# Patient Record
Sex: FEMALE | Race: WHITE | NOT HISPANIC OR LATINO | Employment: PART TIME | URBAN - METROPOLITAN AREA
[De-identification: names, ages, dates, MRNs, and addresses within clinical notes are randomized per-mention and may not be internally consistent; named-entity substitution may affect disease eponyms.]

---

## 2018-02-05 PROBLEM — I47.10 SVT (SUPRAVENTRICULAR TACHYCARDIA): Status: ACTIVE | Noted: 2018-02-05

## 2018-04-05 PROBLEM — I47.19 AVNRT (AV NODAL RE-ENTRY TACHYCARDIA): Status: ACTIVE | Noted: 2018-04-05

## 2020-11-23 PROBLEM — I47.10 SVT (SUPRAVENTRICULAR TACHYCARDIA): Status: RESOLVED | Noted: 2018-02-05 | Resolved: 2020-11-23

## 2020-11-23 PROBLEM — I47.19 AVNRT (AV NODAL RE-ENTRY TACHYCARDIA): Status: RESOLVED | Noted: 2018-04-05 | Resolved: 2020-11-23

## 2022-07-06 PROBLEM — E66.811 CLASS 1 OBESITY DUE TO EXCESS CALORIES WITHOUT SERIOUS COMORBIDITY WITH BODY MASS INDEX (BMI) OF 34.0 TO 34.9 IN ADULT: Status: ACTIVE | Noted: 2022-07-06

## 2022-12-16 DIAGNOSIS — B34.9 NONSPECIFIC SYNDROME SUGGESTIVE OF VIRAL ILLNESS: Primary | ICD-10-CM

## 2022-12-16 RX ORDER — BENZONATATE 100 MG/1
100 CAPSULE ORAL 3 TIMES DAILY PRN
Qty: 20 CAPSULE | Refills: 0 | Status: SHIPPED | OUTPATIENT
Start: 2022-12-16 | End: 2023-01-23

## 2023-05-03 ENCOUNTER — APPOINTMENT (OUTPATIENT)
Dept: LAB | Facility: CLINIC | Age: 78
End: 2023-05-03

## 2023-05-03 DIAGNOSIS — Z00.8 HEALTH EXAMINATION IN POPULATION SURVEY: ICD-10-CM

## 2023-05-03 LAB
CHOLEST SERPL-MCNC: 157 MG/DL
HDLC SERPL-MCNC: 69 MG/DL
LDLC SERPL CALC-MCNC: 74 MG/DL (ref 0–100)
NONHDLC SERPL-MCNC: 88 MG/DL
TRIGL SERPL-MCNC: 71 MG/DL

## 2023-05-05 LAB
EST. AVERAGE GLUCOSE BLD GHB EST-MCNC: 120 MG/DL
HBA1C MFR BLD: 5.8 %

## 2023-05-08 ENCOUNTER — DOCUMENTATION (OUTPATIENT)
Dept: INTERNAL MEDICINE CLINIC | Facility: CLINIC | Age: 78
End: 2023-05-08

## 2023-07-12 ENCOUNTER — OFFICE VISIT (OUTPATIENT)
Dept: GASTROENTEROLOGY | Facility: AMBULARY SURGERY CENTER | Age: 78
End: 2023-07-12
Payer: COMMERCIAL

## 2023-07-12 VITALS
HEIGHT: 64 IN | OXYGEN SATURATION: 98 % | BODY MASS INDEX: 34.59 KG/M2 | SYSTOLIC BLOOD PRESSURE: 126 MMHG | DIASTOLIC BLOOD PRESSURE: 82 MMHG | HEART RATE: 75 BPM | WEIGHT: 202.6 LBS

## 2023-07-12 DIAGNOSIS — Z86.010 HISTORY OF COLON POLYPS: Primary | ICD-10-CM

## 2023-07-12 DIAGNOSIS — K21.00 GASTROESOPHAGEAL REFLUX DISEASE WITH ESOPHAGITIS WITHOUT HEMORRHAGE: ICD-10-CM

## 2023-07-12 PROCEDURE — 99213 OFFICE O/P EST LOW 20 MIN: CPT | Performed by: PHYSICIAN ASSISTANT

## 2023-07-12 RX ORDER — FAMOTIDINE 40 MG/1
40 TABLET, FILM COATED ORAL
Qty: 30 TABLET | Refills: 3 | Status: SHIPPED | OUTPATIENT
Start: 2023-07-12

## 2023-07-12 RX ORDER — OMEPRAZOLE 40 MG/1
40 CAPSULE, DELAYED RELEASE ORAL
Qty: 90 CAPSULE | Refills: 1 | Status: SHIPPED | OUTPATIENT
Start: 2023-07-12

## 2023-07-12 NOTE — PROGRESS NOTES
[facility] - Outpatient Follow-up Note  Joe See 69 y.o. female MRN: [id number]  Encounter: [id number]          ASSESSMENT AND PLAN:      1. GERD  Symptoms worsening over the last several months. Symptoms mainly worse at nighttime. She uses Pepcid and omeprazole occasionally however does not take these consistently. Last EGD in 2021 was normal and biopsies negative for Peterson's. -Recommend starting Pepcid 40 mg daily at bedtime. - If no improvement of this in few weeks, would recommend adding on daily omeprazole 40 mg in the morning.  - Recommend avoiding eating 3 to 4 hours before bedtime.  - Can try to elevate the head of the bed with wedge pillow.  -Patient also interested in attempting weight loss to help with symptoms.  - No alarm symptoms at this time. No indication for repeat EGD. -If no improvement with recommendations above, may need to consider increasing omeprazole to twice daily along with Pepcid. - If symptoms continue to be refractory, consider repeat EGD    2. History of colon polyps  Last colonoscopy 10/2021 with polyps removed and repeat recommended in 3 years.    -Patient will be due for colonoscopy next year 10/2024. Patient is aware. Follow up with me in 3 months. Advised can reach out via [employer] - innovate | mentor | investt with any questions or concerns in the meantime  ______________________________________________________________________    SUBJECTIVE:      Logan Bonilla is a pleasant 15-year-old female with history of GERD, asthma, SVT, Sjogren's syndrome, TABITHA who presents the office for follow-up. Patient was last seen in the office 11/2022. She reports since then having worsening reflux symptoms. She reports symptoms are mainly at night when laying down. She reports very rare symptoms throughout the day. She currently will use Pepcid and omeprazole occasionally, alternating these back-and-forth.   She denies any dysphagia, odynophagia, nausea, vomiting, decreased appetite or unintentional weight loss. She is hoping to work on weight loss soon. She reports her bowel movements are doing very well. She is not currently taking any fiber supplement and reports few bowel movements in the morning which are regular. Most recent lab work earlier this year with normal liver enzymes and hemoglobin. Colonoscopy EGD 10/2021. EGD was normal biopsies negative for H. pylori, Peterson's. Colonoscopy polyp removed and repeat recommended in 3 years    Patient works at [employer] in [facility]. REVIEW OF SYSTEMS IS OTHERWISE NEGATIVE.       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[address]) 04/05/2018   • Bronchitis 06/03/2020   • Chest pain 04/26/2018   • Chronic diarrhea 09/09/2021   • Chronic pain of right knee 07/27/2018   • Conjunctivitis 06/03/2020   • Contact lens/glasses fitting    • COVID-19 virus infection 12/15/2020   • GERD (gastroesophageal reflux disease)    • Gross hematuria 10/02/2018   • Left knee pain 08/07/2018   • Mixed hyperlipidemia 10/01/2019   • Moderate osteopenia 06/24/2016   • Osteoarthritis of knee 11/16/2016   • Paroxysmal supraventricular tachycardia ([address]) 12/05/2013   • Primary osteoarthritis of left knee 06/03/2020   • Primary osteoarthritis of right knee 07/27/2018   • S/P total knee replacement using cement, left 09/24/2018   • Sjogren's syndrome ([address])    • Status post total knee replacement using cement, right 10/14/2019   • SVT (supraventricular tachycardia) (HCC)     hx of- no episodes in 3 yrs   • SVT (supraventricular tachycardia) ([address]) 02/05/2018     Past Surgical History:   Procedure Laterality Date   • AUGMENTATION MAMMAPLASTY Bilateral 2002    RETRO-GLANDULAR SALINE   • AV NODE ABLATION  05/2017   • BREAST CYST ASPIRATION  1970    ? breast   • BREAST SURGERY      bilateral saline implants-right is ruptured   • CATARACT EXTRACTION     • COLONOSCOPY  2016   • JOINT REPLACEMENT      left knee   • KNEE ARTHROSCOPY Bilateral    • SC ARTHRP KNE CONDYLE&PLATU MEDIAL&LAT COMPARTMENTS Left 2018    Procedure: ARTHROPLASTY KNEE TOTAL- Left;  Surgeon: Blu Schafer DO;  Location: [facility];  Service: Orthopedics   • SC ARTHRP KNE CONDYLE&PLATU MEDIAL&LAT COMPARTMENTS Right 10/14/2019    Procedure: ARTHROPLASTY KNEE TOTAL;  Surgeon: Blu Schafer DO;  Location: [facility];  Service: Orthopedics   • SC XCAPSL CTRC RMVL INSJ IO LENS PROSTH W/O ECP Right 2017    Procedure: EXTRACTION EXTRACAPSULAR CATARACT PHACO INTRAOCULAR LENS (IOL); Surgeon: Federico Bledsoe MD;  Location: [facility] OR;  Service: Ophthalmology   • SC XCAPSL CTRC RMVL INSJ IO LENS PROSTH W/O ECP Left 8/15/2017    Procedure: EXTRACTION EXTRACAPSULAR CATARACT PHACO INTRAOCULAR LENS (IOL);   Surgeon: Federico Bledsoe MD;  Location: [facility] OR;  Service: Ophthalmology   • TONSILLECTOMY     • TOTAL ABDOMINAL HYSTERECTOMY Bilateral     age 40   • TOTAL ABDOMINAL HYSTERECTOMY W/ BILATERAL SALPINGOOPHORECTOMY Bilateral     age 40   • UPPER GASTROINTESTINAL ENDOSCOPY       Social History   Social History     Substance and Sexual Activity   Alcohol Use Not Currently     Social History     Substance and Sexual Activity   Drug Use No     Social History     Tobacco Use   Smoking Status Former   • Types: Cigarettes   • Quit date: 1968   • Years since quittin.8   Smokeless Tobacco Never     Family History   Problem Relation Age of Onset   • Dementia Mother    • Diabetes Mother    • Other Father         paraplegic   • Aneurysm Father    • Hypertension Brother    • Thyroid disease Brother    • Diabetes Brother    • Prostate cancer Brother 76   • Sudden death Brother    • No Known Problems Daughter    • Breast cancer Maternal Grandmother 79   • No Known Problems Maternal Grandfather    • No Known Problems Paternal Grandmother    • No Known Problems Paternal Grandfather    • No Known Problems Daughter    • Cancer Paternal Aunt 80        unknown       Meds/Allergies       Current Outpatient Medications:   •  albuterol (Ventolin HFA) 90 mcg/act inhaler  •  amoxicillin (AMOXIL) 500 mg capsule  •  Biotin 84305 MCG TABS  •  calcium carbonate (TUMS) 500 mg chewable tablet  •  Cholecalciferol (D3-1000 PO)  •  famotidine (PEPCID) 40 MG tablet  •  metoprolol succinate (TOPROL-XL) 25 mg 24 hr tablet  •  omeprazole (PriLOSEC) 40 MG capsule  •  rosuvastatin (CRESTOR) 5 mg tablet  •  vitamin E, tocopherol, 400 units capsule  •  zinc gluconate 50 mg tablet  •  Ascorbic Acid (C 1000 PO)    Allergies   Allergen Reactions   • Other      Adhesive Tape-red, blisters   • Medical Tape Hives           Objective     Blood pressure 126/82, pulse 75, height 5' 4" (1.626 m), weight 91.9 kg (202 lb 9.6 oz), SpO2 98 %, not currently breastfeeding. Body mass index is 34.78 kg/m². PHYSICAL EXAM:      General Appearance:   Alert, cooperative, no distress   HEENT:   Normocephalic, atraumatic, anicteric.     Neck:  Supple, symmetrical, trachea midline   Lungs:   Clear to auscultation bilaterally; no rales, rhonchi or wheezing; respirations unlabored    Heart[de-identified]   Regular rate and rhythm; no murmur, rub, or gallop. Abdomen:   Soft, non-tender, non-distended; normal bowel sounds; no masses, no organomegaly    Genitalia:   Deferred    Rectal:   Deferred    Extremities:  No cyanosis, clubbing or edema    Pulses:  2+ and symmetric    Skin:  No jaundice, rashes, or lesions    Lymph nodes:  No palpable cervical lymphadenopathy        Lab Results:   No visits with results within 1 Day(s) from this visit.    Latest known visit with results is:   Appointment on 05/03/2023   Component Date Value   • Cholesterol 05/03/2023 157    • Triglycerides 05/03/2023 71    • HDL, Direct 05/03/2023 69    • LDL Calculated 05/03/2023 74    • Non-HDL-Chol (CHOL-HDL) 05/03/2023 88    • Hemoglobin A1C 05/03/2023 5.8 (H)    • EAG 05/03/2023 120          Radiology Results:   No results found.

## 2023-07-12 NOTE — PATIENT INSTRUCTIONS
Start famotidine (Pepcid) 40 mg before laying down for bed. If no improvement of symptoms while on this, Start Prilosec 40 mg daily in the morning along with the Pepcid at bedtime.

## 2023-08-10 ENCOUNTER — APPOINTMENT (OUTPATIENT)
Dept: LAB | Facility: CLINIC | Age: 78
End: 2023-08-10
Payer: COMMERCIAL

## 2023-08-10 DIAGNOSIS — M35.01 KERATOCONJUNCTIVITIS SICCA (HCC): ICD-10-CM

## 2023-08-10 DIAGNOSIS — R06.02 SHORTNESS OF BREATH: ICD-10-CM

## 2023-08-10 DIAGNOSIS — Z79.899 ENCOUNTER FOR LONG-TERM (CURRENT) USE OF OTHER MEDICATIONS: ICD-10-CM

## 2023-08-10 DIAGNOSIS — I49.3 PVC (PREMATURE VENTRICULAR CONTRACTION): ICD-10-CM

## 2023-08-10 DIAGNOSIS — E78.2 MIXED HYPERLIPIDEMIA: ICD-10-CM

## 2023-08-10 LAB
ALBUMIN SERPL BCP-MCNC: 3.9 G/DL (ref 3.5–5)
ALP SERPL-CCNC: 63 U/L (ref 34–104)
ALT SERPL W P-5'-P-CCNC: 9 U/L (ref 7–52)
ANION GAP SERPL CALCULATED.3IONS-SCNC: 6 MMOL/L
AST SERPL W P-5'-P-CCNC: 16 U/L (ref 13–39)
BASOPHILS # BLD AUTO: 0.06 THOUSANDS/ÂΜL (ref 0–0.1)
BASOPHILS NFR BLD AUTO: 1 % (ref 0–1)
BILIRUB SERPL-MCNC: 0.69 MG/DL (ref 0.2–1)
BUN SERPL-MCNC: 17 MG/DL (ref 5–25)
CALCIUM SERPL-MCNC: 9.1 MG/DL (ref 8.4–10.2)
CHLORIDE SERPL-SCNC: 108 MMOL/L (ref 96–108)
CK SERPL-CCNC: 109 U/L (ref 26–192)
CO2 SERPL-SCNC: 27 MMOL/L (ref 21–32)
CREAT SERPL-MCNC: 0.74 MG/DL (ref 0.6–1.3)
CRP SERPL QL: 3.6 MG/L
EOSINOPHIL # BLD AUTO: 0.2 THOUSAND/ÂΜL (ref 0–0.61)
EOSINOPHIL NFR BLD AUTO: 4 % (ref 0–6)
ERYTHROCYTE [DISTWIDTH] IN BLOOD BY AUTOMATED COUNT: 15 % (ref 11.6–15.1)
ERYTHROCYTE [SEDIMENTATION RATE] IN BLOOD: 13 MM/HOUR (ref 0–29)
GFR SERPL CREATININE-BSD FRML MDRD: 78 ML/MIN/1.73SQ M
GLUCOSE P FAST SERPL-MCNC: 90 MG/DL (ref 65–99)
HCT VFR BLD AUTO: 44 % (ref 34.8–46.1)
HGB BLD-MCNC: 14.4 G/DL (ref 11.5–15.4)
IMM GRANULOCYTES # BLD AUTO: 0 THOUSAND/UL (ref 0–0.2)
IMM GRANULOCYTES NFR BLD AUTO: 0 % (ref 0–2)
LYMPHOCYTES # BLD AUTO: 2.86 THOUSANDS/ÂΜL (ref 0.6–4.47)
LYMPHOCYTES NFR BLD AUTO: 50 % (ref 14–44)
MCH RBC QN AUTO: 28.6 PG (ref 26.8–34.3)
MCHC RBC AUTO-ENTMCNC: 32.7 G/DL (ref 31.4–37.4)
MCV RBC AUTO: 87 FL (ref 82–98)
MONOCYTES # BLD AUTO: 0.54 THOUSAND/ÂΜL (ref 0.17–1.22)
MONOCYTES NFR BLD AUTO: 10 % (ref 4–12)
NEUTROPHILS # BLD AUTO: 1.96 THOUSANDS/ÂΜL (ref 1.85–7.62)
NEUTS SEG NFR BLD AUTO: 35 % (ref 43–75)
NRBC BLD AUTO-RTO: 0 /100 WBCS
PLATELET # BLD AUTO: 201 THOUSANDS/UL (ref 149–390)
PMV BLD AUTO: 10.5 FL (ref 8.9–12.7)
POTASSIUM SERPL-SCNC: 3.7 MMOL/L (ref 3.5–5.3)
PROT SERPL-MCNC: 6.5 G/DL (ref 6.4–8.4)
RBC # BLD AUTO: 5.04 MILLION/UL (ref 3.81–5.12)
SODIUM SERPL-SCNC: 141 MMOL/L (ref 135–147)
WBC # BLD AUTO: 5.62 THOUSAND/UL (ref 4.31–10.16)

## 2023-08-10 PROCEDURE — 85025 COMPLETE CBC W/AUTO DIFF WBC: CPT

## 2023-08-10 PROCEDURE — 82550 ASSAY OF CK (CPK): CPT

## 2023-08-10 PROCEDURE — 80053 COMPREHEN METABOLIC PANEL: CPT

## 2023-08-10 PROCEDURE — 85652 RBC SED RATE AUTOMATED: CPT

## 2023-08-10 PROCEDURE — 36415 COLL VENOUS BLD VENIPUNCTURE: CPT

## 2023-08-10 PROCEDURE — 86140 C-REACTIVE PROTEIN: CPT

## 2023-09-18 ENCOUNTER — OFFICE VISIT (OUTPATIENT)
Dept: INTERNAL MEDICINE CLINIC | Facility: CLINIC | Age: 78
End: 2023-09-18
Payer: COMMERCIAL

## 2023-09-18 VITALS
SYSTOLIC BLOOD PRESSURE: 120 MMHG | DIASTOLIC BLOOD PRESSURE: 74 MMHG | HEART RATE: 55 BPM | HEIGHT: 64 IN | BODY MASS INDEX: 33.63 KG/M2 | WEIGHT: 197 LBS | OXYGEN SATURATION: 95 % | TEMPERATURE: 98 F

## 2023-09-18 DIAGNOSIS — N39.490 OVERFLOW INCONTINENCE OF URINE: ICD-10-CM

## 2023-09-18 DIAGNOSIS — Z13.1 SCREENING FOR DIABETES MELLITUS: ICD-10-CM

## 2023-09-18 DIAGNOSIS — Z13.220 SCREENING, LIPID: ICD-10-CM

## 2023-09-18 DIAGNOSIS — F51.04 PSYCHOPHYSIOLOGICAL INSOMNIA: Primary | ICD-10-CM

## 2023-09-18 DIAGNOSIS — K21.00 GASTROESOPHAGEAL REFLUX DISEASE WITH ESOPHAGITIS WITHOUT HEMORRHAGE: ICD-10-CM

## 2023-09-18 DIAGNOSIS — M35.00 SJOGREN'S SYNDROME, WITH UNSPECIFIED ORGAN INVOLVEMENT (HCC): ICD-10-CM

## 2023-09-18 PROCEDURE — 99214 OFFICE O/P EST MOD 30 MIN: CPT | Performed by: INTERNAL MEDICINE

## 2023-09-18 RX ORDER — ALPRAZOLAM 0.25 MG/1
0.25 TABLET ORAL
Qty: 10 TABLET | Refills: 0 | Status: SHIPPED | OUTPATIENT
Start: 2023-09-18

## 2023-09-18 NOTE — PROGRESS NOTES
Assessment/Plan:    GERD (gastroesophageal reflux disease)  Taking omeprazole most days    Psychophysiological insomnia  Limited use of Xanax    Sjogren's syndrome (HCC)  Declines prescription medications from rheumatology    Overflow incontinence of urine  F/u with gynecology         Problem List Items Addressed This Visit        Digestive    GERD (gastroesophageal reflux disease)     Taking omeprazole most days            Other    Sjogren's syndrome ([address])     Declines prescription medications from rheumatology         Overflow incontinence of urine     F/u with gynecology         Psychophysiological insomnia - Primary     Limited use of Xanax         Relevant Medications    ALPRAZolam (XANAX) 0.25 mg tablet   Other Visit Diagnoses     Screening for diabetes mellitus        Relevant Orders    HEMOGLOBIN A1C W/ EAG ESTIMATION    Screening, lipid        Relevant Orders    Lipid panel            Subjective:      Patient ID: Jason Mascorro is a 69 y.o. female. HPI  Here for a follow up  Under a lot of stress from significant other is going to hospice today for COVID lungs  Chronic fatigue and poor sleep initiation and maintenance since COVID  Requesting medications for sleep    The following portions of the patient's history were reviewed and updated as appropriate: allergies, current medications, past family history, past medical history, past social history, past surgical history and problem list.    Review of Systems   Constitutional: Positive for fatigue. Negative for unexpected weight change. HENT:        Dry mouth   Respiratory: Negative for shortness of breath (weigh exertion and uses albuterol before exercise). Cardiovascular: Positive for palpitations (brief but frequent; Holter ordered). Negative for chest pain. Genitourinary: Positive for frequency and urgency. Musculoskeletal: Positive for arthralgias and back pain. Neurological: Negative for dizziness.    Psychiatric/Behavioral: Positive for sleep disturbance. Objective:      /74 (BP Location: Left arm, Patient Position: Sitting, Cuff Size: Large)   Pulse 55   Temp 98 °F (36.7 °C) (Oral)   Ht 5' 4.27" (1.632 m)   Wt 89.4 kg (197 lb)   SpO2 95%   BMI 33.53 kg/m²          Physical Exam  Constitutional:       Appearance: She is not ill-appearing. Cardiovascular:      Rate and Rhythm: Regular rhythm. Heart sounds: Normal heart sounds. Pulmonary:      Breath sounds: Normal breath sounds. Abdominal:      Palpations: Abdomen is soft. Tenderness: There is no abdominal tenderness. Musculoskeletal:      Cervical back: Neck supple. Right lower leg: No edema. Left lower leg: No edema. Psychiatric:         Mood and Affect: Mood is anxious.

## 2023-09-21 ENCOUNTER — HOSPITAL ENCOUNTER (OUTPATIENT)
Dept: NON INVASIVE DIAGNOSTICS | Facility: CLINIC | Age: 78
Discharge: HOME | End: 2023-09-21
Payer: COMMERCIAL

## 2023-09-21 ENCOUNTER — APPOINTMENT (OUTPATIENT)
Dept: RADIOLOGY | Facility: HOSPITAL | Age: 78
End: 2023-09-21
Payer: COMMERCIAL

## 2023-09-21 ENCOUNTER — HOSPITAL ENCOUNTER (OUTPATIENT)
Dept: RADIOLOGY | Facility: HOSPITAL | Age: 78
Discharge: HOME | End: 2023-09-21
Payer: COMMERCIAL

## 2023-09-21 DIAGNOSIS — I49.3 PVC (PREMATURE VENTRICULAR CONTRACTION): ICD-10-CM

## 2023-09-21 DIAGNOSIS — M54.89 VERTEBROGENIC PAIN SYNDROME: ICD-10-CM

## 2023-09-21 DIAGNOSIS — R06.02 SHORTNESS OF BREATH: ICD-10-CM

## 2023-09-21 PROCEDURE — 93225 XTRNL ECG REC<48 HRS REC: CPT

## 2023-09-21 PROCEDURE — 93226 XTRNL ECG REC<48 HR SCAN A/R: CPT

## 2023-09-22 ENCOUNTER — HOSPITAL ENCOUNTER (OUTPATIENT)
Dept: RADIOLOGY | Facility: HOSPITAL | Age: 78
Discharge: HOME | End: 2023-09-22
Payer: COMMERCIAL

## 2023-09-22 DIAGNOSIS — I49.3 PVC (PREMATURE VENTRICULAR CONTRACTION): ICD-10-CM

## 2023-09-22 PROCEDURE — 72070 X-RAY EXAM THORAC SPINE 2VWS: CPT

## 2023-09-22 RX ORDER — METOPROLOL SUCCINATE 25 MG/1
25 TABLET, EXTENDED RELEASE ORAL DAILY
Qty: 30 TABLET | Refills: 11 | Status: SHIPPED | OUTPATIENT
Start: 2023-09-22

## 2023-09-28 PROCEDURE — 93227 XTRNL ECG REC<48 HR R&I: CPT | Performed by: INTERNAL MEDICINE

## 2023-10-04 NOTE — PROGRESS NOTES
Cardiology Follow Up    Abhijeet See  [birth year]  [id number]  [facility] Palma  [address] [place] [address] [phone]    1. PVC (premature ventricular contraction)  metoprolol succinate (TOPROL-XL) 25 mg 24 hr tablet      2. Shortness of breath        3. Mixed hyperlipidemia        4. SVT (supraventricular tachycardia)        5. Obstructive sleep apnea syndrome        6. Pure hypercholesterolemia  rosuvastatin (CRESTOR) 5 mg tablet          Interval History: Patient is here for a follow-up visit. Patient has SVT (AVNRT, with ablation 4/2018), HLD, FH of heart disease,  TABITHA and PVCs. Patient saw Dr. Philip Conde 2022. Echocardiogram done January 2023 demonstrated LVEF of 60% with mild MR and mild to moderate TR. Pharmacologic stress test done March 2023 was probably normal study. Possibility of apical artifact was noted. Lipid profile done May 2023 looked good. 24-hour HM done September 2023 demonstrated a moderate to high burden of PVCs at 11%. NSR with an average heart rate of 69 was noted. Prior HM done February 2023 demonstrated 4885 PVCs. HM done January 2023 demonstrated an 9.1% burden of PVCs with over 9000 noted. Patient has had palpitations. Patient has been under a lot of stress. Blood work August 2023 looked stable compared to prior values. Patient has some concerns about fatigue. 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 Patient's  passed away recently and I passed along my sympathy. She also lost her brother and sister-in-law in the past year.     Patient Active Problem List   Diagnosis   • Asthma   • GERD (gastroesophageal reflux disease)   • Osteopenia of spine   • Solitary pulmonary nodule   • Vitamin D deficiency   • Sjogren's syndrome ([address])   • Ascending aortic aneurysm ([address])   • H/O atrioventricular manny ablation   • Thyroid nodule   • Pure hypercholesterolemia   • PVC (premature ventricular contraction)   • Class 1 obesity due to excess calories without serious comorbidity with body mass index (BMI) of 34.0 to 34.9 in adult   • Obstructive sleep apnea syndrome   • Overflow incontinence of urine   • Encounter for gynecological examination without abnormal finding   • History of hysterectomy   • Encounter for screening mammogram for malignant neoplasm of breast   • Hypoestrogenism   • Psychophysiological insomnia     Past Medical History:   Diagnosis Date   • Acute bronchitis 06/03/2020   • Aftercare following left knee joint replacement surgery 11/06/2018   • Arthritis    • Arthritis 02/05/2018   • AVNRT (AV manny re-entry tachycardia) ([address]) 04/05/2018   • Bronchitis 06/03/2020   • Chest pain 04/26/2018   • Chronic diarrhea 09/09/2021   • Chronic pain of right knee 07/27/2018   • Conjunctivitis 06/03/2020   • Contact lens/glasses fitting    • COVID-19 virus infection 12/15/2020   • GERD (gastroesophageal reflux disease)    • Gross hematuria 10/02/2018   • Left knee pain 08/07/2018   • Mixed hyperlipidemia 10/01/2019   • Moderate osteopenia 06/24/2016   • Osteoarthritis of knee 11/16/2016   • Paroxysmal supraventricular tachycardia ([address]) 12/05/2013   • Primary osteoarthritis of left knee 06/03/2020   • Primary osteoarthritis of right knee 07/27/2018   • S/P total knee replacement using cement, left 09/24/2018   • Sjogren's syndrome ([address])    • Status post total knee replacement using cement, right 10/14/2019   • SVT (supraventricular tachycardia) (HCC)     hx of- no episodes in 3 yrs   • SVT (supraventricular tachycardia) ([address]) 02/05/2018     Social History     Socioeconomic History   • Marital status:      Spouse name: Not on file   • Number of children: Not on file   • Years of education: Not on file   • Highest education level: Not on file   Occupational History   • Not on file   Tobacco Use   • Smoking status: Former     Types: Cigarettes Quit date: 1968     Years since quittin.0   • Smokeless tobacco: Never   Vaping Use   • Vaping Use: Never used   Substance and Sexual Activity   • Alcohol use: Not Currently   • Drug use: No   • Sexual activity: Not Currently     Partners: Male   Other Topics Concern   • Not on file   Social History Narrative   • Not on file     Social Determinants of Health     Financial Resource Strain: Low Risk  (2022)    Overall Financial Resource Strain (CARDIA)    • Difficulty of Paying Living Expenses: Not hard at all   Food Insecurity: No Food Insecurity (2022)    Hunger Vital Sign    • Worried About Running Out of Food in the Last Year: Never true    • Ran Out of Food in the Last Year: Never true   Transportation Needs: No Transportation Needs (2022)    PRAPARE - Transportation    • Lack of Transportation (Medical): No    • Lack of Transportation (Non-Medical):  No   Physical Activity: Not on file   Stress: Not on file   Social Connections: Not on file   Intimate Partner Violence: Not on file   Housing Stability: Low Risk  (2022)    Housing Stability Vital Sign    • Unable to Pay for Housing in the Last Year: No    • Number of Places Lived in the Last Year: 1    • Unstable Housing in the Last Year: No      Family History   Problem Relation Age of Onset   • Dementia Mother    • Diabetes Mother    • Other Father         paraplegic   • Aneurysm Father    • Hypertension Brother    • Thyroid disease Brother    • Diabetes Brother    • Prostate cancer Brother 76   • Sudden death Brother    • No Known Problems Daughter    • Breast cancer Maternal Grandmother 79   • No Known Problems Maternal Grandfather    • No Known Problems Paternal Grandmother    • No Known Problems Paternal Grandfather    • No Known Problems Daughter    • Cancer Paternal Aunt 80        unknown     Past Surgical History:   Procedure Laterality Date   • AUGMENTATION MAMMAPLASTY Bilateral     RETRO-GLANDULAR SALINE   • AV NODE ABLATION  05/2017   • BREAST CYST ASPIRATION  1970    ? breast   • BREAST SURGERY      bilateral saline implants-right is ruptured   • CATARACT EXTRACTION     • COLONOSCOPY  2016   • JOINT REPLACEMENT      left knee   • KNEE ARTHROSCOPY Bilateral    • MT ARTHRP KNE CONDYLE&PLATU MEDIAL&LAT COMPARTMENTS Left 9/24/2018    Procedure: ARTHROPLASTY KNEE TOTAL- Left;  Surgeon: Padmini Israel DO;  Location: [facility];  Service: Orthopedics   • MT ARTHRP KNE CONDYLE&PLATU MEDIAL&LAT COMPARTMENTS Right 10/14/2019    Procedure: ARTHROPLASTY KNEE TOTAL;  Surgeon: Padmini Israel DO;  Location: [facility];  Service: Orthopedics   • MT XCAPSL CTRC RMVL INSJ IO LENS PROSTH W/O ECP Right 8/1/2017    Procedure: EXTRACTION EXTRACAPSULAR CATARACT PHACO INTRAOCULAR LENS (IOL); Surgeon: Suzie Bo MD;  Location: [facility] MAIN OR;  Service: Ophthalmology   • MT XCAPSL CTRC RMVL INSJ IO LENS PROSTH W/O ECP Left 8/15/2017    Procedure: EXTRACTION EXTRACAPSULAR CATARACT PHACO INTRAOCULAR LENS (IOL);   Surgeon: Suzie Bo MD;  Location: [facility] MAIN OR;  Service: Ophthalmology   • TONSILLECTOMY     • TOTAL ABDOMINAL HYSTERECTOMY Bilateral 1991    age 40   • TOTAL ABDOMINAL HYSTERECTOMY W/ BILATERAL SALPINGOOPHORECTOMY Bilateral 1991    age 40   • UPPER GASTROINTESTINAL ENDOSCOPY         Current Outpatient Medications:   •  albuterol (Ventolin HFA) 90 mcg/act inhaler, Inhale 2 puffs every 4 (four) hours as needed for wheezing or shortness of breath, Disp: 18 g, Rfl: 0  •  amoxicillin (AMOXIL) 500 mg capsule, , Disp: , Rfl:   •  Biotin 98940 MCG TABS, Take 1 tablet by mouth daily, Disp: , Rfl:   •  calcium carbonate (TUMS) 500 mg chewable tablet, Chew 1 tablet if needed for indigestion or heartburn, Disp: , Rfl:   •  Cholecalciferol (D3-1000 PO), Take 1 capsule by mouth daily, Disp: , Rfl:   •  famotidine (PEPCID) 40 MG tablet, Take 1 tablet (40 mg total) by mouth daily at bedtime, Disp: 30 tablet, Rfl: 3  •  metoprolol succinate (TOPROL-XL) 25 mg 24 hr tablet, Take 0.5 tablets (12.5 mg total) by mouth 2 (two) times a day, Disp: 90 tablet, Rfl: 3  •  omeprazole (PriLOSEC) 40 MG capsule, Take 1 capsule (40 mg total) by mouth daily before breakfast, Disp: 90 capsule, Rfl: 1  •  rosuvastatin (CRESTOR) 5 mg tablet, Take 1 tablet (5 mg total) by mouth daily, Disp: 90 tablet, Rfl: 3  •  vitamin E, tocopherol, 400 units capsule, Take 400 Units by mouth daily, Disp: , Rfl:   •  zinc gluconate 50 mg tablet, Take 50 mg by mouth daily, Disp: , Rfl:   •  ALPRAZolam (XANAX) 0.25 mg tablet, Take 1 tablet (0.25 mg total) by mouth daily at bedtime as needed for sleep (Patient not taking: Reported on 10/5/2023), Disp: 10 tablet, Rfl: 0  •  Ascorbic Acid (C 1000 PO), Take 1 capsule by mouth daily (Patient not taking: Reported on 7/12/2023), Disp: , Rfl:   Allergies   Allergen Reactions   • Other      Adhesive Tape-red, blisters   • Medical Tape Hives       Labs:not applicable  Imaging: Holter monitor    Result Date: 9/28/2023  Narrative: Indication: PVC's The patient was in sinus rhythm throughout the recording. Minimum HR: 51 Average HR: 69 Maximum HR: 97 Premature ventricular contractions: 18829 (11%) Ventricular runs: none Supraventricular contractions: 33 Supraventricular runs: none Longest RR: 1.5 sec Arrhythmias: None Diary submitted: yes, one episode of feeling a PVC with correlation by monitor Impression Abnormal Holter Moderate to High burden of PVC's (11%) Signed by: Kitty Ulloa MD    XR spine thoracic 2 vw    Result Date: 9/28/2023  Narrative: THORACIC SPINE INDICATION:   M54.89: Other dorsalgia. COMPARISON:  None VIEWS:  XR SPINE THORACIC 2 VW FINDINGS: There is no fracture or pathologic bone lesion. Thoracic vertebral alignment is within normal limits. Mild thoracolumbar scoliosis. No significant degenerative changes. There is no displacement of the paraspinal line. The pedicles appear intact. Impression: No acute osseous abnormality. Workstation performed: [id number]       Review of Systems:  Review of Systems   All other systems reviewed and are negative. Physical Exam:  /62 (BP Location: Right arm, Patient Position: Sitting, Cuff Size: Large)   Pulse 58 Comment: L raidal pulse. Wt 89 kg (196 lb 3.2 oz)   SpO2 95%   BMI 33.39 kg/m²   Physical Exam  Vitals reviewed. Constitutional:       Appearance: She is well-developed. HENT:      Head: Normocephalic and atraumatic. Eyes:      Conjunctiva/sclera: Conjunctivae normal.      Pupils: Pupils are equal, round, and reactive to light. Cardiovascular:      Rate and Rhythm: Normal rate. Heart sounds: Normal heart sounds. Pulmonary:      Effort: Pulmonary effort is normal.      Breath sounds: Normal breath sounds. Musculoskeletal:      Cervical back: Normal range of motion and neck supple. Skin:     General: Skin is warm and dry. Neurological:      Mental Status: She is alert and oriented to person, place, and time. Discussion/Summary: I have asked the patient to split her metoprolol succinate and take 12.5 mg twice a day. She will call back if there is any ongoing issue. Of asked her to use caution with caffeine. I will see her in follow-up in 6 months.

## 2023-10-05 ENCOUNTER — OFFICE VISIT (OUTPATIENT)
Dept: CARDIOLOGY CLINIC | Facility: CLINIC | Age: 78
End: 2023-10-05
Payer: COMMERCIAL

## 2023-10-05 VITALS
HEART RATE: 58 BPM | DIASTOLIC BLOOD PRESSURE: 62 MMHG | SYSTOLIC BLOOD PRESSURE: 102 MMHG | BODY MASS INDEX: 33.39 KG/M2 | WEIGHT: 196.2 LBS | OXYGEN SATURATION: 95 %

## 2023-10-05 DIAGNOSIS — I47.10 SVT (SUPRAVENTRICULAR TACHYCARDIA): ICD-10-CM

## 2023-10-05 DIAGNOSIS — E78.2 MIXED HYPERLIPIDEMIA: ICD-10-CM

## 2023-10-05 DIAGNOSIS — E78.00 PURE HYPERCHOLESTEROLEMIA: ICD-10-CM

## 2023-10-05 DIAGNOSIS — G47.33 OBSTRUCTIVE SLEEP APNEA SYNDROME: ICD-10-CM

## 2023-10-05 DIAGNOSIS — I49.3 PVC (PREMATURE VENTRICULAR CONTRACTION): Primary | ICD-10-CM

## 2023-10-05 DIAGNOSIS — R06.02 SHORTNESS OF BREATH: ICD-10-CM

## 2023-10-05 PROCEDURE — 99214 OFFICE O/P EST MOD 30 MIN: CPT | Performed by: INTERNAL MEDICINE

## 2023-10-05 RX ORDER — ROSUVASTATIN CALCIUM 5 MG/1
5 TABLET, COATED ORAL DAILY
Qty: 90 TABLET | Refills: 3 | Status: SHIPPED | OUTPATIENT
Start: 2023-10-05

## 2023-10-05 RX ORDER — METOPROLOL SUCCINATE 25 MG/1
12.5 TABLET, EXTENDED RELEASE ORAL 2 TIMES DAILY
Qty: 90 TABLET | Refills: 3 | Status: SHIPPED | OUTPATIENT
Start: 2023-10-05

## 2023-12-14 ENCOUNTER — ANNUAL EXAM (OUTPATIENT)
Dept: OBGYN CLINIC | Facility: CLINIC | Age: 78
End: 2023-12-14

## 2023-12-14 VITALS
DIASTOLIC BLOOD PRESSURE: 80 MMHG | RESPIRATION RATE: 18 BRPM | HEART RATE: 68 BPM | WEIGHT: 196.8 LBS | SYSTOLIC BLOOD PRESSURE: 119 MMHG | HEIGHT: 64 IN | BODY MASS INDEX: 33.6 KG/M2

## 2023-12-14 DIAGNOSIS — N39.490 OVERFLOW INCONTINENCE OF URINE: ICD-10-CM

## 2023-12-14 DIAGNOSIS — Z01.419 ENCOUNTER FOR GYNECOLOGICAL EXAMINATION WITHOUT ABNORMAL FINDING: Primary | ICD-10-CM

## 2023-12-14 DIAGNOSIS — Z90.710 HISTORY OF HYSTERECTOMY: ICD-10-CM

## 2023-12-14 DIAGNOSIS — Z12.31 ENCOUNTER FOR SCREENING MAMMOGRAM FOR MALIGNANT NEOPLASM OF BREAST: ICD-10-CM

## 2023-12-14 PROCEDURE — S0612 ANNUAL GYNECOLOGICAL EXAMINA: HCPCS | Performed by: NURSE PRACTITIONER

## 2023-12-14 NOTE — PROGRESS NOTES
Delgado Santos is a 69 y.o. female who presents for annual well woman exam. Hx of hysterectomy and BSO in . Done for hyperplasia, denies cancer was found. Rt saline breast implant deflation, no plans for repair. Hx of cardiac ablation for SVT, [place]. Recently seen by [facility] for possible overflow incontinence. Pt reports hx of BABAK but will have leakage of urine at any time and needs to wear pads. Is considering referral to urogyn. Reports she does not want surgery. She does have hx of cystocele and rectocele surgery after her hysterectomy. Her significant other passed away 2023. She reports she is doing OK, keeps up with her friendships with others. GYN:  no vaginal discharge, labial erythema or lesions, dyspareunia. Patient is not sexually active   Gynecologic surgery: hysterectomy, BSO, hx of cystocele and rectocele repair    OB:  [id number] female. Pregnancies were complicated by none, . :  no dysuria, urinary frequency or urgency. no hematuria, flank pain,urine overflow / BABAK incontinence. Hx of cystocele/rectocele repair years ago after hysterectomy    Breast:  no breast mass, skin changes, dimpling, reddening, nipple retraction. Rt breast deflated saline implant. no breast discharge. Last mammogram was in 3/6/2023. Results were negative, BI-RADS 1. Patient does  have a family history of breast [place] [facility] [place], no endometrial, or ovarian ca. 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 Results were negative, denies abnormal history . Breast cancer: last mammogram in 3/6/2023. Results were negative. Colon cancer: last colonoscopy in 10/4/2021. Results were polyps removed, due 3 years from last.  DEXA scan 5/3/2023 there was an increase in bone density in her left hip.   Osteopenia, lumbar spine T-score -1.6 left hip T-score -0.3, left femoral neck T-score -1.2, right forearm T-score -2.1, uses  calcium and vitamin D    Review of Systems  Pertinent items are noted in HPI. Objective      /80 (BP Location: Right arm, Patient Position: Sitting, Cuff Size: Adult)   Pulse 68   Resp 18   Ht 5' 4" (1.626 m)   Wt 89.3 kg (196 lb 12.8 oz)   BMI 33.78 kg/m²     Physical Exam  Constitutional:       Appearance: Normal appearance. Cardiovascular:      Rate and Rhythm: Normal rate and regular rhythm. Pulmonary:      Effort: Pulmonary effort is normal.      Breath sounds: Normal breath sounds. Chest:   Breasts:     Right: No swelling, mass, nipple discharge or tenderness. Left: No swelling, mass, nipple discharge or tenderness. Comments: Rt breast implant deflated, no masses palpated. Left breast implant intact. Abdominal:      Palpations: Abdomen is soft. Tenderness: There is no abdominal tenderness. Genitourinary:     Labia:         Right: No rash or lesion. Left: No rash or lesion. Urethra: No urethral lesion. Vagina: No vaginal discharge or prolapsed vaginal walls. Uterus: Absent. Adnexa:         Right: No tenderness or fullness. Left: No tenderness or fullness. Comments: Small speculum used  Cx- surgically absent  Uterus- surgically absent  Adnexa- Hx of BSO, no masses palpated  Rectal - deferred  Lymphadenopathy:      Upper Body:      Right upper body: No supraclavicular, axillary or pectoral adenopathy. Left upper body: No supraclavicular, axillary or pectoral adenopathy. Assessment     Merlinda Novas is a 69 y.o. [id number] with normal gynecologic exam, but reports incontinence,  S/P hysterectomy     Plan       65+  1. Routine well woman exam done today  2. Current ASCCP Guidelines reviewed - Pap testing no longer indicated. 3.  Mammogram ordered, yearly mammography recommended.  She  is due 3/2024  4. Colonoscopy recommended per guidelines. She is due 10/2024  5. DEXA scan is not due, last done 5/3/2023, increased in bone density  6. The following were reviewed in today's visit: breast self exam, mammography screening ordered, adequate intake of calcium and vitamin D, exercise, and healthy diet  7. Patient to return to office in 12 months for annual, pt desires to RTO yearly and MMG yearly due to family hx of breast cancer in her MGM. 8. Incontinence will refer to urogyn  9. Pt desires health maintenance blood work BMP, CBC,TSH, Hgb A1c    Depression Screening Follow-up Plan: Patient's depression screening was negative with a PHQ-2 score of 0. Their PHQ-9 score was 0 . Clinically patient does not have depression. No treatment is required. BMI Counseling: Body mass index is 33.78 kg/m². The BMI is above normal. Nutrition recommendations include 3-5 servings of fruits/vegetables daily and moderation in carbohydrate intake. Exercise recommendations include exercising 3-5 times per week.

## 2023-12-18 ENCOUNTER — APPOINTMENT (OUTPATIENT)
Dept: LAB | Facility: CLINIC | Age: 78
End: 2023-12-18
Payer: COMMERCIAL

## 2023-12-18 DIAGNOSIS — Z12.31 ENCOUNTER FOR SCREENING MAMMOGRAM FOR MALIGNANT NEOPLASM OF BREAST: ICD-10-CM

## 2023-12-18 LAB
ANION GAP SERPL CALCULATED.3IONS-SCNC: 5 MMOL/L
BASOPHILS # BLD AUTO: 0.06 THOUSANDS/ÂΜL (ref 0–0.1)
BASOPHILS NFR BLD AUTO: 1 % (ref 0–1)
BUN SERPL-MCNC: 14 MG/DL (ref 5–25)
CALCIUM SERPL-MCNC: 9.4 MG/DL (ref 8.4–10.2)
CHLORIDE SERPL-SCNC: 107 MMOL/L (ref 96–108)
CO2 SERPL-SCNC: 28 MMOL/L (ref 21–32)
CREAT SERPL-MCNC: 0.76 MG/DL (ref 0.6–1.3)
EOSINOPHIL # BLD AUTO: 0.14 THOUSAND/ÂΜL (ref 0–0.61)
EOSINOPHIL NFR BLD AUTO: 3 % (ref 0–6)
ERYTHROCYTE [DISTWIDTH] IN BLOOD BY AUTOMATED COUNT: 15 % (ref 11.6–15.1)
EST. AVERAGE GLUCOSE BLD GHB EST-MCNC: 131 MG/DL
GFR SERPL CREATININE-BSD FRML MDRD: 75 ML/MIN/1.73SQ M
GLUCOSE P FAST SERPL-MCNC: 86 MG/DL (ref 65–99)
HBA1C MFR BLD: 6.2 %
HCT VFR BLD AUTO: 45.6 % (ref 34.8–46.1)
HGB BLD-MCNC: 14.6 G/DL (ref 11.5–15.4)
IMM GRANULOCYTES # BLD AUTO: 0.01 THOUSAND/UL (ref 0–0.2)
IMM GRANULOCYTES NFR BLD AUTO: 0 % (ref 0–2)
LYMPHOCYTES # BLD AUTO: 2.43 THOUSANDS/ÂΜL (ref 0.6–4.47)
LYMPHOCYTES NFR BLD AUTO: 43 % (ref 14–44)
MCH RBC QN AUTO: 28.2 PG (ref 26.8–34.3)
MCHC RBC AUTO-ENTMCNC: 32 G/DL (ref 31.4–37.4)
MCV RBC AUTO: 88 FL (ref 82–98)
MONOCYTES # BLD AUTO: 0.59 THOUSAND/ÂΜL (ref 0.17–1.22)
MONOCYTES NFR BLD AUTO: 11 % (ref 4–12)
NEUTROPHILS # BLD AUTO: 2.35 THOUSANDS/ÂΜL (ref 1.85–7.62)
NEUTS SEG NFR BLD AUTO: 42 % (ref 43–75)
NRBC BLD AUTO-RTO: 0 /100 WBCS
PLATELET # BLD AUTO: 203 THOUSANDS/UL (ref 149–390)
PMV BLD AUTO: 10.5 FL (ref 8.9–12.7)
POTASSIUM SERPL-SCNC: 3.7 MMOL/L (ref 3.5–5.3)
RBC # BLD AUTO: 5.17 MILLION/UL (ref 3.81–5.12)
SODIUM SERPL-SCNC: 140 MMOL/L (ref 135–147)
TSH SERPL DL<=0.05 MIU/L-ACNC: 2.77 UIU/ML (ref 0.45–4.5)
WBC # BLD AUTO: 5.58 THOUSAND/UL (ref 4.31–10.16)

## 2023-12-18 PROCEDURE — 36415 COLL VENOUS BLD VENIPUNCTURE: CPT

## 2023-12-18 PROCEDURE — 85025 COMPLETE CBC W/AUTO DIFF WBC: CPT | Performed by: NURSE PRACTITIONER

## 2023-12-18 PROCEDURE — 83036 HEMOGLOBIN GLYCOSYLATED A1C: CPT

## 2023-12-18 PROCEDURE — 84443 ASSAY THYROID STIM HORMONE: CPT

## 2023-12-18 PROCEDURE — 80048 BASIC METABOLIC PNL TOTAL CA: CPT

## 2023-12-19 ENCOUNTER — TELEPHONE (OUTPATIENT)
Dept: OBGYN CLINIC | Facility: CLINIC | Age: 78
End: 2023-12-19

## 2023-12-19 NOTE — TELEPHONE ENCOUNTER
----- Message from ANASTASIYA Ken sent at 12/18/2023 10:02 PM EST -----  Labs results, CBC, BMP and TSH within normal. Hgb A1C increased but still in prediabetic range- follow up with PCP. Will send my chart message to pt.

## 2024-01-04 DIAGNOSIS — K21.00 GASTROESOPHAGEAL REFLUX DISEASE WITH ESOPHAGITIS WITHOUT HEMORRHAGE: ICD-10-CM

## 2024-01-04 RX ORDER — OMEPRAZOLE 40 MG/1
40 CAPSULE, DELAYED RELEASE ORAL
Qty: 90 CAPSULE | Refills: 0 | Status: SHIPPED | OUTPATIENT
Start: 2024-01-04

## 2024-02-21 PROBLEM — Z01.419 ENCOUNTER FOR GYNECOLOGICAL EXAMINATION WITHOUT ABNORMAL FINDING: Status: RESOLVED | Noted: 2022-12-02 | Resolved: 2024-02-21

## 2024-03-06 ENCOUNTER — APPOINTMENT (OUTPATIENT)
Dept: LAB | Facility: CLINIC | Age: 79
End: 2024-03-06
Payer: COMMERCIAL

## 2024-03-06 DIAGNOSIS — Z13.220 SCREENING, LIPID: ICD-10-CM

## 2024-03-06 DIAGNOSIS — Z79.899 ENCOUNTER FOR LONG-TERM (CURRENT) USE OF OTHER MEDICATIONS: ICD-10-CM

## 2024-03-06 DIAGNOSIS — M35.01 SJOGREN SYNDROME WITH KERATOCONJUNCTIVITIS (HCC): ICD-10-CM

## 2024-03-06 DIAGNOSIS — Z13.1 SCREENING FOR DIABETES MELLITUS: ICD-10-CM

## 2024-03-06 LAB
ALBUMIN SERPL BCP-MCNC: 4 G/DL (ref 3.5–5)
ALP SERPL-CCNC: 68 U/L (ref 34–104)
ALT SERPL W P-5'-P-CCNC: 9 U/L (ref 7–52)
ANION GAP SERPL CALCULATED.3IONS-SCNC: 6 MMOL/L
AST SERPL W P-5'-P-CCNC: 16 U/L (ref 13–39)
BASOPHILS # BLD AUTO: 0.04 THOUSANDS/ÂΜL (ref 0–0.1)
BASOPHILS NFR BLD AUTO: 1 % (ref 0–1)
BILIRUB SERPL-MCNC: 0.74 MG/DL (ref 0.2–1)
BUN SERPL-MCNC: 15 MG/DL (ref 5–25)
CALCIUM SERPL-MCNC: 9.4 MG/DL (ref 8.4–10.2)
CHLORIDE SERPL-SCNC: 108 MMOL/L (ref 96–108)
CHOLEST SERPL-MCNC: 164 MG/DL
CO2 SERPL-SCNC: 25 MMOL/L (ref 21–32)
CREAT SERPL-MCNC: 0.61 MG/DL (ref 0.6–1.3)
CRP SERPL QL: 2.8 MG/L
EOSINOPHIL # BLD AUTO: 0.14 THOUSAND/ÂΜL (ref 0–0.61)
EOSINOPHIL NFR BLD AUTO: 2 % (ref 0–6)
ERYTHROCYTE [DISTWIDTH] IN BLOOD BY AUTOMATED COUNT: 14.6 % (ref 11.6–15.1)
ERYTHROCYTE [SEDIMENTATION RATE] IN BLOOD: 20 MM/HOUR (ref 0–29)
EST. AVERAGE GLUCOSE BLD GHB EST-MCNC: 128 MG/DL
GFR SERPL CREATININE-BSD FRML MDRD: 87 ML/MIN/1.73SQ M
GLUCOSE P FAST SERPL-MCNC: 97 MG/DL (ref 65–99)
HBA1C MFR BLD: 6.1 %
HCT VFR BLD AUTO: 44.4 % (ref 34.8–46.1)
HDLC SERPL-MCNC: 65 MG/DL
HGB BLD-MCNC: 14.7 G/DL (ref 11.5–15.4)
IMM GRANULOCYTES # BLD AUTO: 0.01 THOUSAND/UL (ref 0–0.2)
IMM GRANULOCYTES NFR BLD AUTO: 0 % (ref 0–2)
LDLC SERPL CALC-MCNC: 81 MG/DL (ref 0–100)
LYMPHOCYTES # BLD AUTO: 2.33 THOUSANDS/ÂΜL (ref 0.6–4.47)
LYMPHOCYTES NFR BLD AUTO: 39 % (ref 14–44)
MCH RBC QN AUTO: 28.9 PG (ref 26.8–34.3)
MCHC RBC AUTO-ENTMCNC: 33.1 G/DL (ref 31.4–37.4)
MCV RBC AUTO: 87 FL (ref 82–98)
MONOCYTES # BLD AUTO: 0.53 THOUSAND/ÂΜL (ref 0.17–1.22)
MONOCYTES NFR BLD AUTO: 9 % (ref 4–12)
NEUTROPHILS # BLD AUTO: 2.89 THOUSANDS/ÂΜL (ref 1.85–7.62)
NEUTS SEG NFR BLD AUTO: 49 % (ref 43–75)
NONHDLC SERPL-MCNC: 99 MG/DL
NRBC BLD AUTO-RTO: 0 /100 WBCS
PLATELET # BLD AUTO: 199 THOUSANDS/UL (ref 149–390)
PMV BLD AUTO: 10.2 FL (ref 8.9–12.7)
POTASSIUM SERPL-SCNC: 3.5 MMOL/L (ref 3.5–5.3)
PROT SERPL-MCNC: 6.8 G/DL (ref 6.4–8.4)
RBC # BLD AUTO: 5.09 MILLION/UL (ref 3.81–5.12)
SODIUM SERPL-SCNC: 139 MMOL/L (ref 135–147)
TRIGL SERPL-MCNC: 88 MG/DL
WBC # BLD AUTO: 5.94 THOUSAND/UL (ref 4.31–10.16)

## 2024-03-06 PROCEDURE — 80053 COMPREHEN METABOLIC PANEL: CPT

## 2024-03-06 PROCEDURE — 85652 RBC SED RATE AUTOMATED: CPT

## 2024-03-06 PROCEDURE — 86140 C-REACTIVE PROTEIN: CPT

## 2024-03-06 PROCEDURE — 80061 LIPID PANEL: CPT

## 2024-03-06 PROCEDURE — 83036 HEMOGLOBIN GLYCOSYLATED A1C: CPT

## 2024-03-06 PROCEDURE — 85025 COMPLETE CBC W/AUTO DIFF WBC: CPT

## 2024-03-06 PROCEDURE — 36415 COLL VENOUS BLD VENIPUNCTURE: CPT

## 2024-03-07 ENCOUNTER — OFFICE VISIT (OUTPATIENT)
Dept: CARDIOLOGY CLINIC | Facility: CLINIC | Age: 79
End: 2024-03-07
Payer: COMMERCIAL

## 2024-03-07 VITALS
OXYGEN SATURATION: 97 % | DIASTOLIC BLOOD PRESSURE: 78 MMHG | WEIGHT: 196 LBS | BODY MASS INDEX: 32.65 KG/M2 | SYSTOLIC BLOOD PRESSURE: 120 MMHG | HEIGHT: 65 IN | HEART RATE: 60 BPM

## 2024-03-07 DIAGNOSIS — I49.3 PVC (PREMATURE VENTRICULAR CONTRACTION): ICD-10-CM

## 2024-03-07 DIAGNOSIS — G47.33 OBSTRUCTIVE SLEEP APNEA SYNDROME: ICD-10-CM

## 2024-03-07 DIAGNOSIS — E78.00 PURE HYPERCHOLESTEROLEMIA: ICD-10-CM

## 2024-03-07 DIAGNOSIS — I71.21 ANEURYSM OF ASCENDING AORTA WITHOUT RUPTURE (HCC): Primary | ICD-10-CM

## 2024-03-07 PROCEDURE — 99214 OFFICE O/P EST MOD 30 MIN: CPT | Performed by: INTERNAL MEDICINE

## 2024-03-07 RX ORDER — METOPROLOL SUCCINATE 25 MG/1
25 TABLET, EXTENDED RELEASE ORAL DAILY
Qty: 90 TABLET | Refills: 3 | Status: SHIPPED | OUTPATIENT
Start: 2024-03-07

## 2024-03-07 NOTE — PROGRESS NOTES
Cardiology Follow Up    Jeannine See  1945  [id number]  [facility]  [address] [place] [id number] [phone]    1. Aneurysm of ascending aorta without rupture (HCC)  Echo complete w/ contrast if indicated    VAS screening      2. PVC (premature ventricular contraction)  Echo complete w/ contrast if indicated    VAS screening    metoprolol succinate (TOPROL-XL) 25 mg 24 hr tablet      3. Obstructive sleep apnea syndrome  Echo complete w/ contrast if indicated    VAS screening      4. Pure hypercholesterolemia  Echo complete w/ contrast if indicated    VAS screening          Discussion/Summary: She has a high PVC burden of 11%.  She is due for an echocardiogram yearly to follow LV function.  Continue current dose of beta-blocker.  Blood pressure well-controlled.  Lipids have been doing well.  I have ordered a vascular screening.  No further cardiac testing continue diet exercise increase hydration with electrolyte containing products.  I reviewed her CAT scan previously documented ascending aorta was 3.8 cm I do not think this meets criteria for aneurysm.    Interval History: 78-year-old female transitioning to me from one of my partners.  She has a history of SVT ablation, high burden PVCs, hyperlipidemia.  Overall she has been doing well she was under a lot of stress at the last office visit this has eased but not gone away most of it centered around work stress and the passing of her  last year.  Denies any anginal sounding chest pain or discomfort there is been no shortness of breath.  She gets an occasional mild palpitation.  Denies any lower extremity edema, PND, orthopnea.  She has been taking her medications as prescribed.    Medical Problems       Problem List       Asthma    GERD (gastroesophageal reflux disease)    Osteopenia of spine    Solitary pulmonary nodule    Vitamin D deficiency     Sjogren's syndrome (HCC)    Ascending aortic aneurysm (HCC)    H/O atrioventricular manny ablation    Thyroid nodule    Pure hypercholesterolemia    PVC (premature ventricular contraction)    Class 1 obesity due to excess calories without serious comorbidity with body mass index (BMI) of 34.0 to 34.9 in adult    Obstructive sleep apnea syndrome    Overflow incontinence of urine    History of hysterectomy    Encounter for screening mammogram for malignant neoplasm of breast    Hypoestrogenism    Psychophysiological insomnia        Past Medical History:   Diagnosis Date    Acute bronchitis 06/03/2020    Aftercare following left knee joint replacement surgery 11/06/2018    Arthritis     Arthritis 02/05/2018    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    Bronchitis 06/03/2020    Chest pain 04/26/2018    Chronic diarrhea 09/09/2021    Chronic pain of right knee 07/27/2018    Conjunctivitis 06/03/2020    Contact lens/glasses fitting     COVID-19 virus infection 12/15/2020    GERD (gastroesophageal reflux disease)     Gross hematuria 10/02/2018    Left knee pain 08/07/2018    Mixed hyperlipidemia 10/01/2019    Moderate osteopenia 06/24/2016    Osteoarthritis of knee 11/16/2016    Paroxysmal supraventricular tachycardia 12/05/2013    Primary osteoarthritis of left knee 06/03/2020    Primary osteoarthritis of right knee 07/27/2018    S/P total knee replacement using cement, left 09/24/2018    Sjogren's syndrome (HCC)     Status post total knee replacement using cement, right 10/14/2019    SVT (supraventricular tachycardia)     hx of- no episodes in 3 yrs    SVT (supraventricular tachycardia) 02/05/2018     Social History     Socioeconomic History    Marital status:      Spouse name: Not on file    Number of children: Not on file    Years of education: Not on file    Highest education level: Not on file   Occupational History    Not on file   Tobacco Use    Smoking status: Former     Current packs/day: 0.00      Types: Cigarettes     Quit date: 1968     Years since quittin.5    Smokeless tobacco: Never   Vaping Use    Vaping status: Never Used   Substance and Sexual Activity    Alcohol use: Not Currently    Drug use: No    Sexual activity: Not Currently     Partners: Male   Other Topics Concern    Not on file   Social History Narrative    Not on file     Social Determinants of Health     Financial Resource Strain: Low Risk  (2023)    Overall Financial Resource Strain (CARDIA)     Difficulty of Paying Living Expenses: Not very hard   Food Insecurity: No Food Insecurity (2023)    Hunger Vital Sign     Worried About Running Out of Food in the Last Year: Never true     Ran Out of Food in the Last Year: Never true   Transportation Needs: No Transportation Needs (2023)    PRAPARE - Transportation     Lack of Transportation (Medical): No     Lack of Transportation (Non-Medical): No   Physical Activity: Not on file   Stress: Not on file   Social Connections: Not on file   Intimate Partner Violence: Not on file   Housing Stability: Low Risk  (2022)    Housing Stability Vital Sign     Unable to Pay for Housing in the Last Year: No     Number of Places Lived in the Last Year: 1     Unstable Housing in the Last Year: No      Family History   Problem Relation Age of Onset    Dementia Mother     Diabetes Mother     Other Father         paraplegic    Aneurysm Father     Hypertension Brother     Thyroid disease Brother     Diabetes Brother     Prostate cancer Brother 74    Sudden death Brother     No Known Problems Daughter     Breast cancer Maternal Grandmother 70    No Known Problems Maternal Grandfather     No Known Problems Paternal Grandmother     No Known Problems Paternal Grandfather     No Known Problems Daughter     Cancer Paternal Aunt 82        unknown     Past Surgical History:   Procedure Laterality Date    AUGMENTATION MAMMAPLASTY Bilateral     RETRO-GLANDULAR SALINE    AV NODE ABLATION   05/2017    BREAST CYST ASPIRATION  1970    ? breast    BREAST SURGERY      bilateral saline implants-right is ruptured    CATARACT EXTRACTION      COLONOSCOPY  2016    JOINT REPLACEMENT      left knee    KNEE ARTHROSCOPY Bilateral     MI ARTHRP KNE CONDYLE&PLATU MEDIAL&LAT COMPARTMENTS Left 9/24/2018    Procedure: ARTHROPLASTY KNEE TOTAL- Left;  Surgeon: Jaxon Zacarias DO;  Location: [facility] MAIN OR;  Service: Orthopedics    MI ARTHRP KNE CONDYLE&PLATU MEDIAL&LAT COMPARTMENTS Right 10/14/2019    Procedure: ARTHROPLASTY KNEE TOTAL;  Surgeon: Jaxon Zacarias DO;  Location: [facility];  Service: Orthopedics    MI XCAPSL CTRC RMVL INSJ IO LENS PROSTH W/O ECP Right 8/1/2017    Procedure: EXTRACTION EXTRACAPSULAR CATARACT PHACO INTRAOCULAR LENS (IOL);  Surgeon: Singh Delaney MD;  Location: [facility] MAIN OR;  Service: Ophthalmology    MI XCAPSL CTRC RMVL INSJ IO LENS PROSTH W/O ECP Left 8/15/2017    Procedure: EXTRACTION EXTRACAPSULAR CATARACT PHACO INTRAOCULAR LENS (IOL);  Surgeon: Singh Delaney MD;  Location: [facility] MAIN OR;  Service: Ophthalmology    TONSILLECTOMY      TOTAL ABDOMINAL HYSTERECTOMY Bilateral 1991    age 46    TOTAL ABDOMINAL HYSTERECTOMY W/ BILATERAL SALPINGOOPHORECTOMY Bilateral 1991    age 46    UPPER GASTROINTESTINAL ENDOSCOPY         Current Outpatient Medications:     albuterol (Ventolin HFA) 90 mcg/act inhaler, Inhale 2 puffs every 4 (four) hours as needed for wheezing or shortness of breath, Disp: 18 g, Rfl: 0    Ascorbic Acid (C 1000 PO), Take 1 capsule by mouth daily, Disp: , Rfl:     Biotin 50404 MCG TABS, Take 1 tablet by mouth daily, Disp: , Rfl:     calcium carbonate (TUMS) 500 mg chewable tablet, Chew 1 tablet if needed for indigestion or heartburn, Disp: , Rfl:     Cholecalciferol (D3-1000 PO), Take 1 capsule by mouth daily, Disp: , Rfl:     famotidine (PEPCID) 40 MG tablet, Take 1 tablet (40 mg total) by mouth daily at bedtime, Disp: 30 tablet, Rfl: 3    metoprolol succinate (TOPROL-XL) 25  mg 24 hr tablet, Take 1 tablet (25 mg total) by mouth daily, Disp: 90 tablet, Rfl: 3    omeprazole (PriLOSEC) 40 MG capsule, Take 1 capsule (40 mg total) by mouth daily before breakfast, Disp: 90 capsule, Rfl: 0    rosuvastatin (CRESTOR) 5 mg tablet, Take 1 tablet (5 mg total) by mouth daily, Disp: 90 tablet, Rfl: 3    vitamin E, tocopherol, 400 units capsule, Take 400 Units by mouth daily, Disp: , Rfl:     zinc gluconate 50 mg tablet, Take 50 mg by mouth daily, Disp: , Rfl:     ALPRAZolam (XANAX) 0.25 mg tablet, Take 1 tablet (0.25 mg total) by mouth daily at bedtime as needed for sleep (Patient not taking: Reported on 10/5/2023), Disp: 10 tablet, Rfl: 0    amoxicillin (AMOXIL) 500 mg capsule, , Disp: , Rfl:   Allergies   Allergen Reactions    Other      Adhesive Tape-red, blisters    Medical Tape Hives       Labs:     Chemistry        Component Value Date/Time     12/05/2015 0715    K 3.5 03/06/2024 0618    K 4.0 12/05/2015 0715     03/06/2024 0618     12/05/2015 0715    CO2 25 03/06/2024 0618    CO2 27.4 12/05/2015 0715    BUN 15 03/06/2024 0618    BUN 12 12/05/2015 0715    CREATININE 0.61 03/06/2024 0618    CREATININE 0.81 12/05/2015 0715        Component Value Date/Time    CALCIUM 9.4 03/06/2024 0618    CALCIUM 9.1 12/05/2015 0715    ALKPHOS 68 03/06/2024 0618    ALKPHOS 83 12/05/2015 0715    AST 16 03/06/2024 0618    AST 26 12/05/2015 0715    ALT 9 03/06/2024 0618    ALT 19 12/05/2015 0715    BILITOT 0.67 12/05/2015 0715            Lab Results   Component Value Date    CHOL 180 06/12/2015    CHOL 199 06/18/2014    CHOL 178 12/09/2013     Lab Results   Component Value Date    HDL 65 03/06/2024    HDL 69 05/03/2023    HDL 64 02/25/2023     Lab Results   Component Value Date    LDLCALC 81 03/06/2024    LDLCALC 74 05/03/2023    LDLCALC 75 02/25/2023     Lab Results   Component Value Date    TRIG 88 03/06/2024    TRIG 71 05/03/2023    TRIG 76 02/25/2023     No results found for:  "\"CHOLHDL\"    Imaging: No results found.    ECG:        ROS    Vitals:    03/07/24 1453   BP: 120/78   Pulse: 60   SpO2: 97%     Vitals:    03/07/24 1453   Weight: 88.9 kg (196 lb)     Height: 5' 5\" (165.1 cm)   Body mass index is 32.62 kg/m².    Physical Exam:  Vital signs reviewed  General:  Alert and cooperative, appears stated age, no acute distress  HEENT:  PERRLA, EOMI, no scleral icterus, no conjunctival pallor  Neck:  No lymphadenopathy, no thyromegaly, no carotid bruits, no elevated JVP  Heart:  Regular rate and rhythm, normal S1/S2, no S3/S4, no murmur, rubs or gallops.  PMI nondisplaced  Lungs:  Clear to auscultation bilaterally, no wheezes rales or rhonchi  Abdomen:  Soft, non-tender, positive bowel sounds, no rebound or guarding,   no organomegaly   Extremities:  Normal range of motion.  No clubbing, cyanosis or edema   Vascular:  2+ pedal pulses  Skin:  No rashes or lesions on exposed skin  Neurologic:  Cranial nerves II-XII grossly intact without focal deficits      "

## 2024-03-20 ENCOUNTER — OFFICE VISIT (OUTPATIENT)
Dept: INTERNAL MEDICINE CLINIC | Facility: CLINIC | Age: 79
End: 2024-03-20
Payer: COMMERCIAL

## 2024-03-20 VITALS
WEIGHT: 196.4 LBS | OXYGEN SATURATION: 95 % | HEIGHT: 65 IN | HEART RATE: 57 BPM | BODY MASS INDEX: 32.72 KG/M2 | DIASTOLIC BLOOD PRESSURE: 76 MMHG | SYSTOLIC BLOOD PRESSURE: 122 MMHG

## 2024-03-20 DIAGNOSIS — I49.3 PVC (PREMATURE VENTRICULAR CONTRACTION): ICD-10-CM

## 2024-03-20 DIAGNOSIS — R73.03 PREDIABETES: ICD-10-CM

## 2024-03-20 DIAGNOSIS — E78.00 PURE HYPERCHOLESTEROLEMIA: ICD-10-CM

## 2024-03-20 DIAGNOSIS — F51.04 PSYCHOPHYSIOLOGICAL INSOMNIA: Primary | ICD-10-CM

## 2024-03-20 PROCEDURE — 99214 OFFICE O/P EST MOD 30 MIN: CPT | Performed by: INTERNAL MEDICINE

## 2024-03-20 RX ORDER — LORAZEPAM 0.5 MG/1
0.5 TABLET ORAL
Qty: 30 TABLET | Refills: 0 | Status: SHIPPED | OUTPATIENT
Start: 2024-03-20

## 2024-03-20 NOTE — PROGRESS NOTES
Name: Jeannine See      : 1945      MRN: [id number]  Encounter Provider: Lea Reyes, MD  Encounter Date: 3/20/2024   [facility]    Assessment & Plan     1. Psychophysiological insomnia  -     LORazepam (Ativan) 0.5 mg tablet; Take 1 tablet (0.5 mg total) by mouth daily at bedtime as needed for anxiety    2. PVC (premature ventricular contraction)  -     Magnesium 400 MG CAPS; Take 1 capsule (400 mg total) by mouth in the morning    3. Pure hypercholesterolemia  -     Lipid panel; Future; Expected date: 2024  -     Comprehensive metabolic panel; Future; Expected date: 2024    4. Prediabetes  -     Comprehensive metabolic panel; Future; Expected date: 2024  -     Hemoglobin A1C; Future; Expected date: 2024    Continue current medications  Continue lorazepam for sleep  Try magnesium supplement       Subjective      Here for a follow up  Feels tired, poor sleep. She had tinnitus after taking alprazolam but tolerated lorazepam well  Recent labs reviewed and A1C slightly elevated  She is sedentary  Her partner  in September  She continues to work part time      Review of Systems   Constitutional:  Positive for fatigue.   Respiratory:  Negative for shortness of breath.    Cardiovascular:  Negative for chest pain and palpitations.   Gastrointestinal:  Negative for abdominal pain.   Psychiatric/Behavioral:  Positive for dysphoric mood and sleep disturbance.        Current Outpatient Medications on File Prior to Visit   Medication Sig   • albuterol (Ventolin HFA) 90 mcg/act inhaler Inhale 2 puffs every 4 (four) hours as needed for wheezing or shortness of breath   • Ascorbic Acid (C 1000 PO) Take 1 capsule by mouth daily   • Biotin 99948 MCG TABS Take 1 tablet by mouth daily   • calcium carbonate (TUMS) 500 mg chewable tablet Chew 1 tablet if needed for indigestion or heartburn   • Cholecalciferol (D3-1000 PO) Take 1 capsule by mouth  "daily   • famotidine (PEPCID) 40 MG tablet Take 1 tablet (40 mg total) by mouth daily at bedtime   • metoprolol succinate (TOPROL-XL) 25 mg 24 hr tablet Take 1 tablet (25 mg total) by mouth daily   • omeprazole (PriLOSEC) 40 MG capsule Take 1 capsule (40 mg total) by mouth daily before breakfast   • rosuvastatin (CRESTOR) 5 mg tablet Take 1 tablet (5 mg total) by mouth daily   • vitamin E, tocopherol, 400 units capsule Take 400 Units by mouth daily   • zinc gluconate 50 mg tablet Take 50 mg by mouth daily   • amoxicillin (AMOXIL) 500 mg capsule  (Patient not taking: Reported on 3/7/2024)   • [DISCONTINUED] ALPRAZolam (XANAX) 0.25 mg tablet Take 1 tablet (0.25 mg total) by mouth daily at bedtime as needed for sleep (Patient not taking: Reported on 10/5/2023)       Objective     /76 (BP Location: Left arm, Patient Position: Sitting, Cuff Size: Standard)   Pulse 57   Ht 5' 5\" (1.651 m)   Wt 89.1 kg (196 lb 6.4 oz)   SpO2 95%   BMI 32.68 kg/m²     Physical Exam  Constitutional:       Appearance: She is well-developed. She is not ill-appearing.   Eyes:      Conjunctiva/sclera: Conjunctivae normal.   Cardiovascular:      Rate and Rhythm: Normal rate and regular rhythm.      Heart sounds: Normal heart sounds. No murmur heard.  Pulmonary:      Effort: Pulmonary effort is normal. No respiratory distress.      Breath sounds: Normal breath sounds. No wheezing or rales.   Abdominal:      General: There is no distension.      Palpations: Abdomen is soft. There is no mass.      Tenderness: There is no abdominal tenderness. There is no guarding or rebound.   Musculoskeletal:      Cervical back: Neck supple.      Right lower leg: No edema.      Left lower leg: No edema.   Neurological:      Mental Status: She is alert and oriented to person, place, and time.   Psychiatric:         Mood and Affect: Mood normal.         Behavior: Behavior normal.         Thought Content: Thought content normal.         Judgment: Judgment " normal.       Lea Reyes, MD

## 2024-03-27 ENCOUNTER — HOSPITAL ENCOUNTER (OUTPATIENT)
Dept: NON INVASIVE DIAGNOSTICS | Facility: CLINIC | Age: 79
Discharge: HOME | End: 2024-03-27
Payer: COMMERCIAL

## 2024-03-27 ENCOUNTER — HOSPITAL ENCOUNTER (OUTPATIENT)
Dept: VASCULAR ULTRASOUND | Facility: HOSPITAL | Age: 79
Discharge: HOME | End: 2024-03-27
Attending: INTERNAL MEDICINE
Payer: COMMERCIAL

## 2024-03-27 VITALS
WEIGHT: 196.43 LBS | DIASTOLIC BLOOD PRESSURE: 76 MMHG | SYSTOLIC BLOOD PRESSURE: 122 MMHG | HEIGHT: 65 IN | BODY MASS INDEX: 32.73 KG/M2 | HEART RATE: 60 BPM

## 2024-03-27 DIAGNOSIS — I49.3 PVC (PREMATURE VENTRICULAR CONTRACTION): ICD-10-CM

## 2024-03-27 DIAGNOSIS — I71.21 ANEURYSM OF ASCENDING AORTA WITHOUT RUPTURE (HCC): ICD-10-CM

## 2024-03-27 DIAGNOSIS — E78.00 PURE HYPERCHOLESTEROLEMIA: ICD-10-CM

## 2024-03-27 DIAGNOSIS — G47.33 OBSTRUCTIVE SLEEP APNEA SYNDROME: ICD-10-CM

## 2024-03-27 LAB
AORTIC ROOT: 3.1 CM
APICAL FOUR CHAMBER EJECTION FRACTION: 61 %
ASCENDING AORTA: 3.8 CM
BSA FOR ECHO PROCEDURE: 1.96 M2
E WAVE DECELERATION TIME: 248 MS
E/A RATIO: 0.78
FRACTIONAL SHORTENING: 38 (ref 28–44)
INTERVENTRICULAR SEPTUM IN DIASTOLE (PARASTERNAL SHORT AXIS VIEW): 1.1 CM
INTERVENTRICULAR SEPTUM: 1.1 CM (ref 0.6–1.1)
LAAS-AP2: 22 CM2
LAAS-AP4: 11.7 CM2
LEFT ATRIUM SIZE: 3.4 CM
LEFT ATRIUM VOLUME (MOD BIPLANE): 50 ML
LEFT ATRIUM VOLUME INDEX (MOD BIPLANE): 25.5 ML/M2
LEFT INTERNAL DIMENSION IN SYSTOLE: 2.9 CM (ref 2.1–4)
LEFT VENTRICULAR INTERNAL DIMENSION IN DIASTOLE: 4.7 CM (ref 3.5–6)
LEFT VENTRICULAR POSTERIOR WALL IN END DIASTOLE: 1.1 CM
LEFT VENTRICULAR STROKE VOLUME: 71 ML
LVSV (TEICH): 71 ML
MITRAL REGURGITATION PEAK VELOCITY: 4.38 M/S
MITRAL VALVE MEAN INFLOW VELOCITY: 3.71 M/S
MITRAL VALVE REGURGITANT PEAK GRADIENT: 77 MMHG
MV E'TISSUE VEL-LAT: 10 CM/S
MV E'TISSUE VEL-SEP: 8 CM/S
MV PEAK A VEL: 0.9 M/S
MV PEAK E VEL: 70 CM/S
MV STENOSIS PRESSURE HALF TIME: 72 MS
MV VALVE AREA P 1/2 METHOD: 3.06
RIGHT ATRIUM AREA SYSTOLE A4C: 14 CM2
RIGHT VENTRICLE ID DIMENSION: 2.8 CM
SL CV DOP CALC MV VTI RETROGRADE: 173.9 CM
SL CV LEFT ATRIUM LENGTH A2C: 5.8 CM
SL CV LV EF: 60
SL CV MV MEAN GRADIENT RETROGRADE: 58 MMHG
SL CV PED ECHO LEFT VENTRICLE DIASTOLIC VOLUME (MOD BIPLANE) 2D: 104 ML
SL CV PED ECHO LEFT VENTRICLE SYSTOLIC VOLUME (MOD BIPLANE) 2D: 34 ML
TR MAX PG: 29 MMHG
TR PEAK VELOCITY: 2.7 M/S
TRICUSPID ANNULAR PLANE SYSTOLIC EXCURSION: 2.9 CM
TRICUSPID VALVE PEAK REGURGITATION VELOCITY: 2.7 M/S

## 2024-03-27 PROCEDURE — 93978 VASCULAR STUDY: CPT | Performed by: SURGERY

## 2024-03-27 PROCEDURE — 93880 EXTRACRANIAL BILAT STUDY: CPT | Performed by: SURGERY

## 2024-03-27 PROCEDURE — 93922 UPR/L XTREMITY ART 2 LEVELS: CPT | Performed by: SURGERY

## 2024-03-27 PROCEDURE — 93306 TTE W/DOPPLER COMPLETE: CPT | Performed by: INTERNAL MEDICINE

## 2024-03-27 PROCEDURE — 93922 UPR/L XTREMITY ART 2 LEVELS: CPT

## 2024-03-27 PROCEDURE — 93306 TTE W/DOPPLER COMPLETE: CPT

## 2024-03-28 ENCOUNTER — TELEPHONE (OUTPATIENT)
Dept: CARDIOLOGY CLINIC | Facility: CLINIC | Age: 79
End: 2024-03-28

## 2024-03-28 DIAGNOSIS — E04.1 THYROID NODULE: Primary | ICD-10-CM

## 2024-03-28 NOTE — TELEPHONE ENCOUNTER
I spoke with patient. US of thyroid order placed. She is aware of order from Dr Dsouza and will call  central scheduling.

## 2024-03-28 NOTE — TELEPHONE ENCOUNTER
----- Message from Bandar Dsouza, DO sent at 3/27/2024  2:02 PM EDT -----  Can you please tell the patient her vascular screening was normal however they saw a few small thyroid nodules.  I know she had a ultrasound in 2021 if you could please set up for another thyroid ultrasound I like to have a comparison.  Thanks

## 2024-04-01 ENCOUNTER — TELEPHONE (OUTPATIENT)
Dept: CARDIOLOGY CLINIC | Facility: CLINIC | Age: 79
End: 2024-04-01

## 2024-04-01 NOTE — TELEPHONE ENCOUNTER
P/C looking for the results of the Echo     Called back pt and advised will sent over to Dr Dsouza, he is off today, pt verbally understood     Please advise

## 2024-04-10 ENCOUNTER — HOSPITAL ENCOUNTER (OUTPATIENT)
Dept: ULTRASOUND IMAGING | Facility: HOSPITAL | Age: 79
Discharge: HOME | End: 2024-04-10
Attending: INTERNAL MEDICINE
Payer: COMMERCIAL

## 2024-04-10 DIAGNOSIS — E04.1 THYROID NODULE: ICD-10-CM

## 2024-04-10 PROCEDURE — 76536 US EXAM OF HEAD AND NECK: CPT

## 2024-04-24 DIAGNOSIS — Z00.6 ENCOUNTER FOR EXAMINATION FOR NORMAL COMPARISON OR CONTROL IN CLINICAL RESEARCH PROGRAM: ICD-10-CM

## 2024-04-30 ENCOUNTER — APPOINTMENT (OUTPATIENT)
Dept: LAB | Facility: CLINIC | Age: 79
End: 2024-04-30
Payer: COMMERCIAL

## 2024-04-30 DIAGNOSIS — Z00.6 ENCOUNTER FOR EXAMINATION FOR NORMAL COMPARISON OR CONTROL IN CLINICAL RESEARCH PROGRAM: ICD-10-CM

## 2024-04-30 PROCEDURE — 36415 COLL VENOUS BLD VENIPUNCTURE: CPT

## 2024-05-10 ENCOUNTER — HOSPITAL ENCOUNTER (OUTPATIENT)
Facility: HOSPITAL | Age: 79
End: 2024-05-10
Payer: COMMERCIAL

## 2024-05-10 DIAGNOSIS — Z12.31 ENCOUNTER FOR SCREENING MAMMOGRAM FOR MALIGNANT NEOPLASM OF BREAST: ICD-10-CM

## 2024-05-10 PROCEDURE — 77063 BREAST TOMOSYNTHESIS BI: CPT

## 2024-05-10 PROCEDURE — 77067 SCR MAMMO BI INCL CAD: CPT

## 2024-05-12 LAB
APOB+LDLR+PCSK9 GENE MUT ANL BLD/T: NOT DETECTED
BRCA1+BRCA2 DEL+DUP + FULL MUT ANL BLD/T: NOT DETECTED
MLH1+MSH2+MSH6+PMS2 GN DEL+DUP+FUL M: NOT DETECTED

## 2024-05-24 ENCOUNTER — HOSPITAL ENCOUNTER (OUTPATIENT)
Facility: HOSPITAL | Age: 79
Discharge: HOME | End: 2024-05-24

## 2024-05-24 DIAGNOSIS — Z12.31 BREAST CANCER SCREENING BY MAMMOGRAM: ICD-10-CM

## 2024-06-13 DIAGNOSIS — K21.00 GASTROESOPHAGEAL REFLUX DISEASE WITH ESOPHAGITIS WITHOUT HEMORRHAGE: ICD-10-CM

## 2024-06-13 RX ORDER — FAMOTIDINE 40 MG/1
40 TABLET, FILM COATED ORAL
Qty: 90 TABLET | Refills: 1 | Status: SHIPPED | OUTPATIENT
Start: 2024-06-13

## 2024-06-28 ENCOUNTER — OFFICE VISIT (OUTPATIENT)
Dept: INTERNAL MEDICINE CLINIC | Facility: CLINIC | Age: 79
End: 2024-06-28
Payer: COMMERCIAL

## 2024-06-28 VITALS
SYSTOLIC BLOOD PRESSURE: 122 MMHG | BODY MASS INDEX: 32.52 KG/M2 | WEIGHT: 195.2 LBS | OXYGEN SATURATION: 93 % | DIASTOLIC BLOOD PRESSURE: 74 MMHG | HEART RATE: 62 BPM | HEIGHT: 65 IN

## 2024-06-28 DIAGNOSIS — E78.00 PURE HYPERCHOLESTEROLEMIA: ICD-10-CM

## 2024-06-28 DIAGNOSIS — E66.09 CLASS 1 OBESITY DUE TO EXCESS CALORIES WITHOUT SERIOUS COMORBIDITY WITH BODY MASS INDEX (BMI) OF 34.0 TO 34.9 IN ADULT: Primary | ICD-10-CM

## 2024-06-28 PROCEDURE — 99213 OFFICE O/P EST LOW 20 MIN: CPT | Performed by: INTERNAL MEDICINE

## 2024-06-28 NOTE — PROGRESS NOTES
Ambulatory Visit  Name: Jeannine See      : 1945      MRN: [id number]  Encounter Provider: Lea Reyes, MD  Encounter Date: 2024   Encounter department: [facility]    Assessment & Plan   1. Class 1 obesity due to excess calories without serious comorbidity with body mass index (BMI) of 34.0 to 34.9 in adult  Assessment & Plan:  Start Wegovy    Orders:  -     Semaglutide-Weight Management (WEGOVY) 0.25 MG/0.5ML; Inject 0.5 mL (0.25 mg total) under the skin once a week  2. Pure hypercholesterolemia         History of Present Illness     Interested in Wegovy  Weight related condition- high cholesterol  Difficulty losing weight on her own  She works from home, walks for exercise  Admits to frequent snacks        Review of Systems   Constitutional:  Positive for fatigue. Negative for unexpected weight change.   Respiratory:  Negative for shortness of breath.    Cardiovascular:  Negative for chest pain and palpitations.   Gastrointestinal:  Positive for diarrhea (3 a day, chronic). Negative for abdominal pain.   Musculoskeletal:  Positive for arthralgias.   Neurological:  Negative for dizziness and headaches.     Past Medical History:   Diagnosis Date   • Acute bronchitis 2020   • Aftercare following left knee joint replacement surgery 2018   • Arthritis    • Arthritis 2018   • AVNRT (AV manny re-entry tachycardia) 2018   • Bronchitis 2020   • Chest pain 2018   • Chronic diarrhea 2021   • Chronic pain of right knee 2018   • Conjunctivitis 2020   • Contact lens/glasses fitting    • COVID-19 virus infection 12/15/2020   • GERD (gastroesophageal reflux disease)    • Gross hematuria 10/02/2018   • Left knee pain 2018   • Mixed hyperlipidemia 10/01/2019   • Moderate osteopenia 2016   • Osteoarthritis of knee 2016   • Paroxysmal supraventricular tachycardia 2013   • Primary osteoarthritis of left knee  06/03/2020   • Primary osteoarthritis of right knee 07/27/2018   • S/P total knee replacement using cement, left 09/24/2018   • Sjogren's syndrome (HCC)    • Status post total knee replacement using cement, right 10/14/2019   • SVT (supraventricular tachycardia)     hx of- no episodes in 3 yrs   • SVT (supraventricular tachycardia) 02/05/2018     Past Surgical History:   Procedure Laterality Date   • AUGMENTATION MAMMAPLASTY Bilateral 2002    RETRO-GLANDULAR SALINE   • AV NODE ABLATION  05/2017   • BREAST CYST ASPIRATION  1970    ? breast   • BREAST SURGERY      bilateral saline implants-right is ruptured   • CATARACT EXTRACTION     • COLONOSCOPY  2016   • JOINT REPLACEMENT      left knee   • KNEE ARTHROSCOPY Bilateral    • CO ARTHRP KNE CONDYLE&PLATU MEDIAL&LAT COMPARTMENTS Left 9/24/2018    Procedure: ARTHROPLASTY KNEE TOTAL- Left;  Surgeon: Jaxon Zacarias DO;  Location: [facility];  Service: Orthopedics   • CO ARTHRP KNE CONDYLE&PLATU MEDIAL&LAT COMPARTMENTS Right 10/14/2019    Procedure: ARTHROPLASTY KNEE TOTAL;  Surgeon: Jaxon Zacarias DO;  Location: [facility];  Service: Orthopedics   • CO XCAPSL CTRC RMVL INSJ IO LENS PROSTH W/O ECP Right 8/1/2017    Procedure: EXTRACTION EXTRACAPSULAR CATARACT PHACO INTRAOCULAR LENS (IOL);  Surgeon: Singh Delaney MD;  Location: [facility] MAIN OR;  Service: Ophthalmology   • CO XCAPSL CTRC RMVL INSJ IO LENS PROSTH W/O ECP Left 8/15/2017    Procedure: EXTRACTION EXTRACAPSULAR CATARACT PHACO INTRAOCULAR LENS (IOL);  Surgeon: Singh Delaney MD;  Location: [facility] MAIN OR;  Service: Ophthalmology   • TONSILLECTOMY     • TOTAL ABDOMINAL HYSTERECTOMY Bilateral 1991    age 46   • TOTAL ABDOMINAL HYSTERECTOMY W/ BILATERAL SALPINGOOPHORECTOMY Bilateral 1991    age 46   • UPPER GASTROINTESTINAL ENDOSCOPY       Family History   Problem Relation Age of Onset   • Dementia Mother    • Diabetes Mother    • Other Father         paraplegic   • Aneurysm Father    • Hypertension Brother    •  Thyroid disease Brother    • Diabetes Brother    • Prostate cancer Brother 74   • Sudden death Brother    • No Known Problems Daughter    • Breast cancer Maternal Grandmother 70   • No Known Problems Maternal Grandfather    • No Known Problems Paternal Grandmother    • No Known Problems Paternal Grandfather    • No Known Problems Daughter    • Cancer Paternal Aunt 82        unknown     Social History     Tobacco Use   • Smoking status: Former     Current packs/day: 0.00     Types: Cigarettes     Quit date: 1968     Years since quittin.8   • Smokeless tobacco: Never   Vaping Use   • Vaping status: Never Used   Substance and Sexual Activity   • Alcohol use: Not Currently   • Drug use: No   • Sexual activity: Not Currently     Partners: Male     Current Outpatient Medications on File Prior to Visit   Medication Sig   • albuterol (Ventolin HFA) 90 mcg/act inhaler Inhale 2 puffs every 4 (four) hours as needed for wheezing or shortness of breath   • Ascorbic Acid (C 1000 PO) Take 1 capsule by mouth daily   • Biotin 14903 MCG TABS Take 1 tablet by mouth daily   • calcium carbonate (TUMS) 500 mg chewable tablet Chew 1 tablet if needed for indigestion or heartburn   • Cholecalciferol (D3-1000 PO) Take 1 capsule by mouth daily   • famotidine (PEPCID) 40 MG tablet TAKE ONE TABLET BY MOUTH DAILY AT BEDTIME   • Magnesium 400 MG CAPS Take 1 capsule (400 mg total) by mouth in the morning   • metoprolol succinate (TOPROL-XL) 25 mg 24 hr tablet Take 1 tablet (25 mg total) by mouth daily   • omeprazole (PriLOSEC) 40 MG capsule Take 1 capsule (40 mg total) by mouth daily before breakfast   • rosuvastatin (CRESTOR) 5 mg tablet Take 1 tablet (5 mg total) by mouth daily   • vitamin E, tocopherol, 400 units capsule Take 400 Units by mouth daily   • zinc gluconate 50 mg tablet Take 50 mg by mouth daily   • amoxicillin (AMOXIL) 500 mg capsule  (Patient not taking: Reported on 3/7/2024)   • [DISCONTINUED] LORazepam (Ativan) 0.5 mg  "tablet Take 1 tablet (0.5 mg total) by mouth daily at bedtime as needed for anxiety     Allergies   Allergen Reactions   • Other      Adhesive Tape-red, blisters   • Medical Tape Hives     Immunization History   Administered Date(s) Administered   • COVID-19 PFIZER VACCINE 0.3 ML IM 03/18/2021, 04/08/2021, 12/04/2021   • H1N1, All Formulations 11/10/2009   • INFLUENZA 10/13/2004   • Influenza Split High Dose Preservative Free IM 10/17/2018   • Influenza, seasonal, injectable 11/14/2014   • Pneumococcal Conjugate 13-Valent 12/16/2015   • Pneumococcal Polysaccharide PPV23 11/09/2016   • Td (adult), Unspecified 12/13/2004   • Tdap 09/12/2022   • Zoster 12/09/2014   • Zoster Vaccine Recombinant 04/26/2019, 09/03/2019     Objective     /74 (BP Location: Left arm, Patient Position: Sitting, Cuff Size: Standard)   Pulse 62   Ht 5' 5\" (1.651 m)   Wt 88.5 kg (195 lb 3.2 oz)   SpO2 93%   BMI 32.48 kg/m²     Physical Exam  Constitutional:       General: She is not in acute distress.     Appearance: She is well-developed. She is obese. She is not ill-appearing, toxic-appearing or diaphoretic.   Eyes:      Conjunctiva/sclera: Conjunctivae normal.   Cardiovascular:      Rate and Rhythm: Normal rate and regular rhythm.      Heart sounds: Normal heart sounds. No murmur heard.  Pulmonary:      Effort: Pulmonary effort is normal. No respiratory distress.      Breath sounds: Normal breath sounds. No stridor. No wheezing or rales.   Abdominal:      General: There is no distension.      Palpations: Abdomen is soft. There is no mass.      Tenderness: There is no abdominal tenderness. There is no guarding or rebound.   Musculoskeletal:      Cervical back: Neck supple.      Right lower leg: No edema.      Left lower leg: No edema.   Neurological:      Mental Status: She is alert and oriented to person, place, and time.   Psychiatric:         Mood and Affect: Mood normal.         Behavior: Behavior normal.         Thought " Content: Thought content normal.         Judgment: Judgment normal.       Administrative Statements

## 2024-07-01 ENCOUNTER — TELEPHONE (OUTPATIENT)
Age: 79
End: 2024-07-01

## 2024-07-08 ENCOUNTER — TELEPHONE (OUTPATIENT)
Dept: INTERNAL MEDICINE CLINIC | Facility: CLINIC | Age: 79
End: 2024-07-08

## 2024-07-17 NOTE — TELEPHONE ENCOUNTER
Duplicate encounter created, please see telephone encounter from 7-1-2024 regarding Wegovy PA status. Please review patient's chart to see if there is already an encounter regarding the medication in question and to document anything regarding this medication in regards to anything regarding the authorization process etc before creating another encounter Thank You.

## 2024-08-07 DIAGNOSIS — E66.09 CLASS 1 OBESITY DUE TO EXCESS CALORIES WITHOUT SERIOUS COMORBIDITY WITH BODY MASS INDEX (BMI) OF 34.0 TO 34.9 IN ADULT: Primary | ICD-10-CM

## 2024-08-07 RX ORDER — SEMAGLUTIDE 0.5 MG/.5ML
INJECTION, SOLUTION SUBCUTANEOUS
Qty: 2 ML | Refills: 0 | Status: SHIPPED | OUTPATIENT
Start: 2024-08-07

## 2024-08-30 DIAGNOSIS — E66.09 CLASS 1 OBESITY DUE TO EXCESS CALORIES WITHOUT SERIOUS COMORBIDITY WITH BODY MASS INDEX (BMI) OF 34.0 TO 34.9 IN ADULT: ICD-10-CM

## 2024-08-30 RX ORDER — SEMAGLUTIDE 0.5 MG/.5ML
INJECTION, SOLUTION SUBCUTANEOUS
Qty: 2 ML | Refills: 0 | Status: SHIPPED | OUTPATIENT
Start: 2024-08-30

## 2024-09-09 ENCOUNTER — APPOINTMENT (OUTPATIENT)
Dept: LAB | Facility: CLINIC | Age: 79
End: 2024-09-09
Payer: COMMERCIAL

## 2024-09-09 DIAGNOSIS — R73.03 PREDIABETES: ICD-10-CM

## 2024-09-09 DIAGNOSIS — E78.00 PURE HYPERCHOLESTEROLEMIA: ICD-10-CM

## 2024-09-09 LAB
ALBUMIN SERPL BCG-MCNC: 4.2 G/DL (ref 3.5–5)
ALP SERPL-CCNC: 69 U/L (ref 34–104)
ALT SERPL W P-5'-P-CCNC: 10 U/L (ref 7–52)
ANION GAP SERPL CALCULATED.3IONS-SCNC: 8 MMOL/L (ref 4–13)
AST SERPL W P-5'-P-CCNC: 18 U/L (ref 13–39)
BILIRUB SERPL-MCNC: 0.75 MG/DL (ref 0.2–1)
BUN SERPL-MCNC: 14 MG/DL (ref 5–25)
CALCIUM SERPL-MCNC: 9.5 MG/DL (ref 8.4–10.2)
CHLORIDE SERPL-SCNC: 107 MMOL/L (ref 96–108)
CHOLEST SERPL-MCNC: 143 MG/DL
CO2 SERPL-SCNC: 26 MMOL/L (ref 21–32)
CREAT SERPL-MCNC: 0.79 MG/DL (ref 0.6–1.3)
EST. AVERAGE GLUCOSE BLD GHB EST-MCNC: 114 MG/DL
GFR SERPL CREATININE-BSD FRML MDRD: 72 ML/MIN/1.73SQ M
GLUCOSE P FAST SERPL-MCNC: 88 MG/DL (ref 65–99)
HBA1C MFR BLD: 5.6 %
HDLC SERPL-MCNC: 60 MG/DL
LDLC SERPL CALC-MCNC: 69 MG/DL (ref 0–100)
NONHDLC SERPL-MCNC: 83 MG/DL
POTASSIUM SERPL-SCNC: 4.1 MMOL/L (ref 3.5–5.3)
PROT SERPL-MCNC: 6.7 G/DL (ref 6.4–8.4)
SODIUM SERPL-SCNC: 141 MMOL/L (ref 135–147)
TRIGL SERPL-MCNC: 69 MG/DL

## 2024-09-09 PROCEDURE — 80053 COMPREHEN METABOLIC PANEL: CPT

## 2024-09-09 PROCEDURE — 80061 LIPID PANEL: CPT

## 2024-09-09 PROCEDURE — 36415 COLL VENOUS BLD VENIPUNCTURE: CPT

## 2024-09-09 PROCEDURE — 83036 HEMOGLOBIN GLYCOSYLATED A1C: CPT

## 2024-09-23 DIAGNOSIS — E66.811 CLASS 1 OBESITY DUE TO EXCESS CALORIES WITHOUT SERIOUS COMORBIDITY WITH BODY MASS INDEX (BMI) OF 34.0 TO 34.9 IN ADULT: Primary | ICD-10-CM

## 2024-09-23 DIAGNOSIS — E66.09 CLASS 1 OBESITY DUE TO EXCESS CALORIES WITHOUT SERIOUS COMORBIDITY WITH BODY MASS INDEX (BMI) OF 34.0 TO 34.9 IN ADULT: Primary | ICD-10-CM

## 2024-09-23 DIAGNOSIS — I49.3 PVC (PREMATURE VENTRICULAR CONTRACTION): ICD-10-CM

## 2024-09-23 DIAGNOSIS — E78.00 PURE HYPERCHOLESTEROLEMIA: ICD-10-CM

## 2024-09-23 RX ORDER — SEMAGLUTIDE 1 MG/.5ML
INJECTION, SOLUTION SUBCUTANEOUS
Qty: 2 ML | Refills: 0 | Status: SHIPPED | OUTPATIENT
Start: 2024-09-23

## 2024-09-24 RX ORDER — METOPROLOL SUCCINATE 25 MG/1
25 TABLET, EXTENDED RELEASE ORAL DAILY
Qty: 90 TABLET | Refills: 1 | Status: SHIPPED | OUTPATIENT
Start: 2024-09-24

## 2024-09-24 RX ORDER — ROSUVASTATIN CALCIUM 5 MG/1
5 TABLET, COATED ORAL DAILY
Qty: 90 TABLET | Refills: 1 | Status: SHIPPED | OUTPATIENT
Start: 2024-09-24

## 2024-10-02 ENCOUNTER — OFFICE VISIT (OUTPATIENT)
Dept: INTERNAL MEDICINE CLINIC | Facility: CLINIC | Age: 79
End: 2024-10-02
Payer: COMMERCIAL

## 2024-10-02 VITALS
DIASTOLIC BLOOD PRESSURE: 80 MMHG | RESPIRATION RATE: 20 BRPM | HEIGHT: 65 IN | BODY MASS INDEX: 29.82 KG/M2 | HEART RATE: 96 BPM | WEIGHT: 179 LBS | SYSTOLIC BLOOD PRESSURE: 118 MMHG

## 2024-10-02 DIAGNOSIS — Z23 ENCOUNTER FOR IMMUNIZATION: ICD-10-CM

## 2024-10-02 DIAGNOSIS — K21.00 GASTROESOPHAGEAL REFLUX DISEASE WITH ESOPHAGITIS WITHOUT HEMORRHAGE: ICD-10-CM

## 2024-10-02 DIAGNOSIS — Z00.00 ANNUAL PHYSICAL EXAM: Primary | ICD-10-CM

## 2024-10-02 DIAGNOSIS — Z13.1 SCREENING FOR DIABETES MELLITUS: ICD-10-CM

## 2024-10-02 DIAGNOSIS — E78.00 PURE HYPERCHOLESTEROLEMIA: ICD-10-CM

## 2024-10-02 DIAGNOSIS — E66.09 CLASS 1 OBESITY DUE TO EXCESS CALORIES WITHOUT SERIOUS COMORBIDITY WITH BODY MASS INDEX (BMI) OF 34.0 TO 34.9 IN ADULT: ICD-10-CM

## 2024-10-02 DIAGNOSIS — E66.811 CLASS 1 OBESITY DUE TO EXCESS CALORIES WITHOUT SERIOUS COMORBIDITY WITH BODY MASS INDEX (BMI) OF 34.0 TO 34.9 IN ADULT: ICD-10-CM

## 2024-10-02 DIAGNOSIS — Z13.220 SCREENING, LIPID: ICD-10-CM

## 2024-10-02 DIAGNOSIS — N39.41 URGE INCONTINENCE OF URINE: ICD-10-CM

## 2024-10-02 PROBLEM — Z12.31 ENCOUNTER FOR SCREENING MAMMOGRAM FOR MALIGNANT NEOPLASM OF BREAST: Status: RESOLVED | Noted: 2022-12-02 | Resolved: 2024-10-02

## 2024-10-02 PROBLEM — Z90.710 HISTORY OF HYSTERECTOMY: Status: RESOLVED | Noted: 2022-12-02 | Resolved: 2024-10-02

## 2024-10-02 PROBLEM — E28.39 HYPOESTROGENISM: Status: RESOLVED | Noted: 2022-12-02 | Resolved: 2024-10-02

## 2024-10-02 PROCEDURE — 90662 IIV NO PRSV INCREASED AG IM: CPT

## 2024-10-02 PROCEDURE — 90472 IMMUNIZATION ADMIN EACH ADD: CPT

## 2024-10-02 PROCEDURE — 99397 PER PM REEVAL EST PAT 65+ YR: CPT | Performed by: INTERNAL MEDICINE

## 2024-10-02 PROCEDURE — 90471 IMMUNIZATION ADMIN: CPT

## 2024-10-02 PROCEDURE — 90678 RSV VACC PREF BIVALENT IM: CPT

## 2024-10-02 RX ORDER — FAMOTIDINE 40 MG/1
40 TABLET, FILM COATED ORAL
Qty: 90 TABLET | Refills: 3 | Status: SHIPPED | OUTPATIENT
Start: 2024-10-02

## 2024-10-02 NOTE — PROGRESS NOTES
Adult Annual Physical  Name: Jeannine See      : 1945      MRN: [id number]  Encounter Provider: Lea Reyes, MD  Encounter Date: 10/2/2024   [facility]: [facility]    Assessment & Plan  Annual physical exam         Gastroesophageal reflux disease with esophagitis without hemorrhage    Orders:    famotidine (PEPCID) 40 MG tablet; Take 1 tablet (40 mg total) by mouth daily at bedtime    Encounter for immunization    Orders:    influenza vaccine, high-dose, PF 0.5 mL (Fluzone High Dose)    Respiratory Syncytial Virus (RSV) vaccine (recombinant) (Abrysvo)    Pure hypercholesterolemia    Orders:    CBC and differential; Future    Comprehensive metabolic panel; Future    Lipid panel; Future    Screening for diabetes mellitus    Orders:    Hemoglobin A1C; Future    Screening, lipid    Orders:    Lipid panel; Future    Urge incontinence of urine    Orders:    UA w Reflex to Microscopic w Reflex to Culture; Future    Class 1 obesity due to excess calories without serious comorbidity with body mass index (BMI) of 34.0 to 34.9 in adult  Tolerating Wegovy , just raised to 1mg  She has lost 15lbs         Immunizations and preventive care screenings were discussed with patient today. Appropriate education was printed on patient's after visit summary.    Counseling:  Exercise: the importance of regular exercise/physical activity was discussed. Recommend exercise 3-5 times per week for at least 30 minutes.          History of Present Illness     Adult Annual Physical:  Patient presents for annual physical.     Diet and Physical Activity:  - Diet/Nutrition: well balanced diet.  - Exercise: walking.    Depression Screening:  - PHQ-2 Score: 0    General Health:  - Sleep: sleeps well. magnesium has helped  - Vision: goes for regular eye exams.  - Dental: regular dental visits.    /GYN Health:  - Follows with GYN: no.   - Menopause: postmenopausal.     Review of Systems  "  Constitutional:  Positive for fatigue.   Respiratory:  Negative for shortness of breath.    Cardiovascular:  Negative for chest pain and palpitations.   Gastrointestinal:  Negative for abdominal pain, constipation and diarrhea.   Genitourinary:  Positive for urgency.   Musculoskeletal:  Positive for back pain.   Neurological:  Negative for dizziness and headaches.         Objective     /80 (BP Location: Left arm, Patient Position: Sitting, Cuff Size: Standard)   Pulse 96   Resp 20   Ht 5' 5\" (1.651 m)   Wt 81.2 kg (179 lb)   BMI 29.79 kg/m²     Physical Exam  Constitutional:       General: She is not in acute distress.     Appearance: She is well-developed. She is not ill-appearing, toxic-appearing or diaphoretic.   HENT:      Right Ear: External ear normal. There is no impacted cerumen.      Left Ear: External ear normal. There is no impacted cerumen.   Eyes:      Conjunctiva/sclera: Conjunctivae normal.   Cardiovascular:      Rate and Rhythm: Normal rate and regular rhythm.      Heart sounds: Normal heart sounds. No murmur heard.  Pulmonary:      Effort: Pulmonary effort is normal. No respiratory distress.      Breath sounds: Normal breath sounds. No stridor. No wheezing or rales.   Abdominal:      General: There is no distension.      Palpations: Abdomen is soft. There is no mass.      Tenderness: There is no abdominal tenderness. There is no guarding or rebound.   Musculoskeletal:      Cervical back: Neck supple.      Right lower leg: No edema.      Left lower leg: No edema.   Neurological:      Mental Status: She is alert and oriented to person, place, and time.   Psychiatric:         Mood and Affect: Mood normal.         Behavior: Behavior normal.         Thought Content: Thought content normal.         Judgment: Judgment normal.         "

## 2024-10-02 NOTE — PATIENT INSTRUCTIONS
"Patient Education     Routine physical for adults   The Basics   Written by the doctors and editors at [facility]   What is a physical? -- A physical is a routine visit, or \"check-up,\" with your doctor. You might also hear it called a \"wellness visit\" or \"preventive visit.\"  During each visit, the doctor will:   Ask about your physical and mental health   Ask about your habits, behaviors, and lifestyle   Do an exam   Give you vaccines if needed   Talk to you about any medicines you take   Give advice about your health   Answer your questions  Getting regular check-ups is an important part of taking care of your health. It can help your doctor find and treat any problems you have. But it's also important for preventing health problems.  A routine physical is different from a \"sick visit.\" A sick visit is when you see a doctor because of a health concern or problem. Since physicals are scheduled ahead of time, you can think about what you want to ask the doctor.  How often should I get a physical? -- It depends on your age and health. In general, for people age 21 years and older:   If you are younger than 50 years, you might be able to get a physical every 3 years.   If you are 50 years or older, your doctor might recommend a physical every year.  If you have an ongoing health condition, like diabetes or high blood pressure, your doctor will probably want to see you more often.  What happens during a physical? -- In general, each visit will include:   Physical exam - The doctor or nurse will check your height, weight, heart rate, and blood pressure. They will also look at your eyes and ears. They will ask about how you are feeling and whether you have any symptoms that bother you.   Medicines - It's a good idea to bring a list of all the medicines you take to each doctor visit. Your doctor will talk to you about your medicines and answer any questions. Tell them if you are having any side effects that bother you. You " "should also tell them if you are having trouble paying for any of your medicines.   Habits and behaviors - This includes:   Your diet   Your exercise habits   Whether you smoke, drink alcohol, or use drugs   Whether you are sexually active   Whether you feel safe at home  Your doctor will talk to you about things you can do to improve your health and lower your risk of health problems. They will also offer help and support. For example, if you want to quit smoking, they can give you advice and might prescribe medicines. If you want to improve your diet or get more physical activity, they can help you with this, too.   Lab tests, if needed - The tests you get will depend on your age and situation. For example, your doctor might want to check your:   Cholesterol   Blood sugar   Iron level   Vaccines - The recommended vaccines will depend on your age, health, and what vaccines you already had. Vaccines are very important because they can prevent certain serious or deadly infections.   Discussion of screening - \"Screening\" means checking for diseases or other health problems before they cause symptoms. Your doctor can recommend screening based on your age, risk, and preferences. This might include tests to check for:   Cancer, such as breast, prostate, cervical, ovarian, colorectal, prostate, lung, or skin cancer   Sexually transmitted infections, such as chlamydia and gonorrhea   Mental health conditions like depression and anxiety  Your doctor will talk to you about the different types of screening tests. They can help you decide which screenings to have. They can also explain what the results might mean.   Answering questions - The physical is a good time to ask the doctor or nurse questions about your health. If needed, they can refer you to other doctors or specialists, too.  Adults older than 65 years often need other care, too. As you get older, your doctor will talk to you about:   How to prevent falling at " home   Hearing or vision tests   Memory testing   How to take your medicines safely   Making sure that you have the help and support you need at home  All topics are updated as new evidence becomes available and our peer review process is complete.  This topic retrieved from [employer] on: May 02, 2024.  Topic 676543 Version 1.0  Release: 32.4.3 - C32.122  © 2024 UpToDate, Inc. and/or its affiliates. All rights reserved.  Consumer Information Use and Disclaimer   Disclaimer: This generalized information is a limited summary of diagnosis, treatment, and/or medication information. It is not meant to be comprehensive and should be used as a tool to help the user understand and/or assess potential diagnostic and treatment options. It does NOT include all information about conditions, treatments, medications, side effects, or risks that may apply to a specific patient. It is not intended to be medical advice or a substitute for the medical advice, diagnosis, or treatment of a health care provider based on the health care provider's examination and assessment of a patient's specific and unique circumstances. Patients must speak with a health care provider for complete information about their health, medical questions, and treatment options, including any risks or benefits regarding use of medications. This information does not endorse any treatments or medications as safe, effective, or approved for treating a specific patient. UpToDate, Inc. and its affiliates disclaim any warranty or liability relating to this information or the use thereof.The use of this information is governed by the Terms of Use, available at https://www.woltersBugsnaguwer.com/en/know/clinical-effectiveness-terms. 2024© UpToDate, Inc. and its affiliates and/or licensors. All rights reserved.  Copyright   © 2024 UpToDate, Inc. and/or its affiliates. All rights reserved.

## 2024-10-17 DIAGNOSIS — E66.811 CLASS 1 OBESITY DUE TO EXCESS CALORIES WITHOUT SERIOUS COMORBIDITY WITH BODY MASS INDEX (BMI) OF 34.0 TO 34.9 IN ADULT: Primary | ICD-10-CM

## 2024-10-17 DIAGNOSIS — E66.09 CLASS 1 OBESITY DUE TO EXCESS CALORIES WITHOUT SERIOUS COMORBIDITY WITH BODY MASS INDEX (BMI) OF 34.0 TO 34.9 IN ADULT: Primary | ICD-10-CM

## 2024-10-17 RX ORDER — SEMAGLUTIDE 1.7 MG/.75ML
INJECTION, SOLUTION SUBCUTANEOUS
Qty: 3 ML | Refills: 0 | Status: SHIPPED | OUTPATIENT
Start: 2024-10-17

## 2024-11-12 DIAGNOSIS — E66.811 CLASS 1 OBESITY DUE TO EXCESS CALORIES WITHOUT SERIOUS COMORBIDITY WITH BODY MASS INDEX (BMI) OF 34.0 TO 34.9 IN ADULT: Primary | ICD-10-CM

## 2024-11-12 DIAGNOSIS — E66.09 CLASS 1 OBESITY DUE TO EXCESS CALORIES WITHOUT SERIOUS COMORBIDITY WITH BODY MASS INDEX (BMI) OF 34.0 TO 34.9 IN ADULT: Primary | ICD-10-CM

## 2024-11-12 RX ORDER — SEMAGLUTIDE 2.4 MG/.75ML
INJECTION, SOLUTION SUBCUTANEOUS
Qty: 3 ML | Refills: 2 | Status: SHIPPED | OUTPATIENT
Start: 2024-11-12

## 2024-11-14 ENCOUNTER — TELEPHONE (OUTPATIENT)
Age: 79
End: 2024-11-14

## 2024-12-11 DIAGNOSIS — E66.811 CLASS 1 OBESITY DUE TO EXCESS CALORIES WITHOUT SERIOUS COMORBIDITY WITH BODY MASS INDEX (BMI) OF 34.0 TO 34.9 IN ADULT: ICD-10-CM

## 2024-12-11 DIAGNOSIS — E66.09 CLASS 1 OBESITY DUE TO EXCESS CALORIES WITHOUT SERIOUS COMORBIDITY WITH BODY MASS INDEX (BMI) OF 34.0 TO 34.9 IN ADULT: ICD-10-CM

## 2024-12-11 RX ORDER — SEMAGLUTIDE 2.4 MG/.75ML
INJECTION, SOLUTION SUBCUTANEOUS
Qty: 3 ML | Refills: 3 | Status: SHIPPED | OUTPATIENT
Start: 2024-12-11

## 2024-12-17 NOTE — PROGRESS NOTES
Subjective     Jeannine Sneha See is a 79 y.o. female who presents for annual well woman exam.   She has history of hysterectomy and BSO in , done for hyperplasia, denies cancer was found.  History of right saline breast implant D5 ablation, no plans for repair.  History of cardiac ablation for SVT, [facility],  history of overflow incontinence, BABAK was considering referral to urogyn does not want surgery.  Has history of cystocele and rectocele surgery after her hysterectomy.  Her significant other passed away 2023.  She is on [employer]  Pt works part-time at [employer]  GYN:  no vaginal discharge, labial erythema or lesions, dyspareunia.  Patient is not sexually active  Gynecologic surgery: History of hysterectomy, BSO, cystocele and rectocele repair    OB:   female. Pregnancies were complicated by .    :  no dysuria, urinary frequency or urgency, at times has urinary symptoms  no hematuria, flank pain, incontinence.    Breast:  no breast mass, skin changes, dimpling, reddening, nipple retraction. Rt breast with implant deflated  no breast discharge.  Patient does  have a family history of breast cancer in her MGM ,no  endometrial, or ovarian ca.     General:  Diet: healthy  Exercise: active  Work: yes, she is a nurse at [employer]  ETOH use: no  Tobacco use: no  Recreational drug use: no    Screening:  Cervical cancer: last pap smear in . Results were negative. She denies abnormal pap hx.   Breast cancer: last mammogram in 5/10/24. Results were negative needed additional imaging of the right breast which she had done on 2024 and was benign, her right subglandular saline implant was unchanged in appearance.  Colon cancer: last colonoscopy in 10/4/2021. Results were polyps, due 3 years from last  STD screening- not needed  DEXA scan was 5/3/2023, osteopenia, there was an increase in bone density in her left hip ,osteopenia, lumbar spine T-score -1.6, left hip T-score -0.3, left femoral neck  "T-score -1.2, uses calcium and vitamin D, Dexa  rescan due 2025    Review of Systems  Pertinent items are noted in HPI.      Objective      /84   Pulse 66   Ht 5' 5\" (1.651 m)   Wt 78 kg (172 lb)   BMI 28.62 kg/m²     Physical Exam  Constitutional:       Appearance: Normal appearance.   Cardiovascular:      Rate and Rhythm: Normal rate and regular rhythm.   Pulmonary:      Effort: Pulmonary effort is normal.      Breath sounds: Normal breath sounds.   Chest:   Breasts:     Right: No inverted nipple, mass, nipple discharge or tenderness.      Left: No inverted nipple, mass, nipple discharge or tenderness.      Comments: Bilateral breast implants, right breast implant deflated.   Abdominal:      Palpations: Abdomen is soft.      Tenderness: There is no abdominal tenderness.   Genitourinary:     Comments: Pt declined pelvic exam- she denies concerns  Lymphadenopathy:      Upper Body:      Right upper body: No supraclavicular, axillary or pectoral adenopathy.      Left upper body: No supraclavicular, axillary or pectoral adenopathy.                 Assessment     Jeannine Sneha See is a 79 y.o.  with normal gynecologic exam. Limited exam, history of hysterectomy     Plan         65+  1.  Routine well woman exam done today  2.  Current ASCCP Guidelines reviewed - Pap testing no longer indicated.   3.  Mammogram ordered, yearly mammography recommended.   4.  Colonoscopy recommended per guidelines.   5.  DEXA scan is due, order placed.  Has Osteopenia  6. The following were reviewed in today's visit: breast self exam and mammography screening ordered, healthy diet, calcium and vitamin D  7. Patient to return to office in 12 months for annual    8. Urinary concerns, patient to complete urine in the lab.      BMI Counseling: Body mass index is 28.62 kg/m². The BMI is above normal. Nutrition recommendations include encouraging healthy choices of fruits and vegetables and moderation in carbohydrate " intake. Exercise recommendations include exercising 3-5 times per week. No pharmacotherapy was ordered. Rationale for BMI follow-up plan is due to patient being overweight or obese.     Depression Screening and Follow-up Plan: Patient was screened for depression during today's encounter. They screened negative with a PHQ-2 score of 0.

## 2024-12-18 ENCOUNTER — ANNUAL EXAM (OUTPATIENT)
Dept: OBGYN CLINIC | Facility: CLINIC | Age: 79
End: 2024-12-18

## 2024-12-18 VITALS
WEIGHT: 172 LBS | SYSTOLIC BLOOD PRESSURE: 121 MMHG | HEIGHT: 65 IN | HEART RATE: 66 BPM | DIASTOLIC BLOOD PRESSURE: 84 MMHG | BODY MASS INDEX: 28.66 KG/M2

## 2024-12-18 DIAGNOSIS — Z12.31 ENCOUNTER FOR SCREENING MAMMOGRAM FOR MALIGNANT NEOPLASM OF BREAST: ICD-10-CM

## 2024-12-18 DIAGNOSIS — R39.9 UTI SYMPTOMS: ICD-10-CM

## 2024-12-18 DIAGNOSIS — Z01.419 ENCOUNTER FOR GYNECOLOGICAL EXAMINATION WITHOUT ABNORMAL FINDING: Primary | ICD-10-CM

## 2024-12-18 DIAGNOSIS — M85.80 OSTEOPENIA, UNSPECIFIED LOCATION: ICD-10-CM

## 2024-12-18 PROCEDURE — S0612 ANNUAL GYNECOLOGICAL EXAMINA: HCPCS | Performed by: NURSE PRACTITIONER

## 2024-12-26 ENCOUNTER — APPOINTMENT (OUTPATIENT)
Dept: LAB | Facility: CLINIC | Age: 79
End: 2024-12-26
Payer: COMMERCIAL

## 2024-12-26 ENCOUNTER — RESULTS FOLLOW-UP (OUTPATIENT)
Dept: OBGYN CLINIC | Facility: CLINIC | Age: 79
End: 2024-12-26

## 2024-12-26 DIAGNOSIS — R39.9 UTI SYMPTOMS: ICD-10-CM

## 2024-12-26 LAB
BACTERIA UR QL AUTO: ABNORMAL /HPF
BILIRUB UR QL STRIP: NEGATIVE
CLARITY UR: ABNORMAL
COLOR UR: YELLOW
GLUCOSE UR STRIP-MCNC: NEGATIVE MG/DL
HGB UR QL STRIP.AUTO: NEGATIVE
HYALINE CASTS #/AREA URNS LPF: ABNORMAL /LPF
KETONES UR STRIP-MCNC: NEGATIVE MG/DL
LEUKOCYTE ESTERASE UR QL STRIP: ABNORMAL
MUCOUS THREADS UR QL AUTO: ABNORMAL
NITRITE UR QL STRIP: NEGATIVE
NON-SQ EPI CELLS URNS QL MICRO: ABNORMAL /HPF
PH UR STRIP.AUTO: 6 [PH]
PROT UR STRIP-MCNC: ABNORMAL MG/DL
RBC #/AREA URNS AUTO: ABNORMAL /HPF
SP GR UR STRIP.AUTO: 1.03 (ref 1–1.03)
UROBILINOGEN UR STRIP-ACNC: 2 MG/DL
WBC #/AREA URNS AUTO: ABNORMAL /HPF

## 2024-12-26 PROCEDURE — 81001 URINALYSIS AUTO W/SCOPE: CPT

## 2024-12-26 PROCEDURE — 87086 URINE CULTURE/COLONY COUNT: CPT

## 2024-12-27 LAB — BACTERIA UR CULT: NORMAL

## 2025-01-03 ENCOUNTER — APPOINTMENT (OUTPATIENT)
Dept: RADIOLOGY | Facility: CLINIC | Age: 80
End: 2025-01-03
Payer: COMMERCIAL

## 2025-01-03 ENCOUNTER — OFFICE VISIT (OUTPATIENT)
Dept: OBGYN CLINIC | Facility: CLINIC | Age: 80
End: 2025-01-03
Payer: COMMERCIAL

## 2025-01-03 VITALS — BODY MASS INDEX: 28.86 KG/M2 | WEIGHT: 173.2 LBS | HEIGHT: 65 IN

## 2025-01-03 DIAGNOSIS — G89.29 CHRONIC PAIN OF RIGHT KNEE: ICD-10-CM

## 2025-01-03 DIAGNOSIS — M76.31 ILIOTIBIAL BAND SYNDROME, RIGHT: Primary | ICD-10-CM

## 2025-01-03 DIAGNOSIS — M25.561 CHRONIC PAIN OF RIGHT KNEE: ICD-10-CM

## 2025-01-03 DIAGNOSIS — M16.11 PRIMARY OSTEOARTHRITIS OF RIGHT HIP: ICD-10-CM

## 2025-01-03 DIAGNOSIS — Z96.651 S/P TOTAL KNEE ARTHROPLASTY, RIGHT: ICD-10-CM

## 2025-01-03 PROCEDURE — 73562 X-RAY EXAM OF KNEE 3: CPT

## 2025-01-03 PROCEDURE — 73502 X-RAY EXAM HIP UNI 2-3 VIEWS: CPT

## 2025-01-03 PROCEDURE — 99214 OFFICE O/P EST MOD 30 MIN: CPT | Performed by: ORTHOPAEDIC SURGERY

## 2025-01-03 NOTE — PROGRESS NOTES
Assessment/Plan:  1. Iliotibial band syndrome, right  Ambulatory Referral to Physical Therapy      2. Primary osteoarthritis of right hip  Ambulatory Referral to Physical Therapy      3. S/P total knee arthroplasty, right  XR knee 3 vw right non injury    XR hip/pelv 2-3 vws right if performed    Ambulatory Referral to Physical Therapy      4. Chronic pain of right knee  Ambulatory Referral to Physical Therapy        Scribe Attestation      I,:  David Gallegos am acting as a scribe while in the presence of the attending physician.:       I,:  Jaxon Zacarias, DO personally performed the services described in this documentation    as scribed in my presence.:           Sneha is a pleasant 71-year-old female who returns today for follow-up evaluation 5-year status post right total knee arthroplasty.  After reviewing her updated imaging and performing a thorough history and physical exam, I explained that she does appear symptomatic of iliotibial band syndrome on the right side.  She also has mild underlying osteoarthritis in her right hip.  I recommended initiating physical therapy and provided her with a referral today.  I also encouraged her to begin Voltaren gel about the lateral aspect of her knee.  We will plan to see her back in 2 months for repeat clinical evaluation.  All of her questions and concerns were addressed today.    Subjective: Follow-up evaluation 5-year status post right total knee arthroplasty    Patient ID: Jeannine Sneha See is a 79 y.o. female who returns today for follow-up evaluation 5-year status post right total knee arthroplasty.  At today's visit, she complains of pain about her right knee and thigh over the last month.  She describes aching soreness about the lateral aspect of her knee that radiates proximally to her hip.  This pain worsens with activity, particularly getting in and out of her car.  She has been quite active recently as she has assumed many of the responsibilities  around her house.  She is also considering moving to [place] in the near future.  She denies any new injury or trauma.    Review of Systems   Constitutional:  Positive for activity change. Negative for chills, fever and unexpected weight change.   HENT:  Negative for hearing loss, nosebleeds and sore throat.    Eyes:  Negative for pain, redness and visual disturbance.   Respiratory:  Negative for cough, shortness of breath and wheezing.    Cardiovascular:  Negative for chest pain, palpitations and leg swelling.   Gastrointestinal:  Negative for abdominal pain, nausea and vomiting.   Endocrine: Negative for polydipsia and polyuria.   Genitourinary:  Negative for dysuria and hematuria.   Musculoskeletal:  Positive for arthralgias and myalgias. Negative for joint swelling.        See HPI   Skin:  Negative for rash and wound.   Neurological:  Negative for dizziness, numbness and headaches.   Psychiatric/Behavioral:  Negative for decreased concentration and suicidal ideas. The patient is not nervous/anxious.          Past Medical History:   Diagnosis Date    Acute bronchitis 06/03/2020    Aftercare following left knee joint replacement surgery 11/06/2018    Arthritis     Arthritis 02/05/2018    AVNRT (AV manny re-entry tachycardia) (HCC) 04/05/2018    Bronchitis 06/03/2020    Chest pain 04/26/2018    Chronic diarrhea 09/09/2021    Chronic pain of right knee 07/27/2018    Conjunctivitis 06/03/2020    Contact lens/glasses fitting     COVID-19 virus infection 12/15/2020    Encounter for screening mammogram for malignant neoplasm of breast 12/02/2022    GERD (gastroesophageal reflux disease)     Gross hematuria 10/02/2018    History of hysterectomy 12/02/2022    Hypoestrogenism 12/02/2022    Left knee pain 08/07/2018    Mixed hyperlipidemia 10/01/2019    Moderate osteopenia 06/24/2016    Osteoarthritis of knee 11/16/2016    Paroxysmal supraventricular tachycardia (HCC) 12/05/2013    Primary osteoarthritis of left knee  06/03/2020    Primary osteoarthritis of right knee 07/27/2018    S/P total knee replacement using cement, left 09/24/2018    Sjogren's syndrome (HCC)     Status post total knee replacement using cement, right 10/14/2019    SVT (supraventricular tachycardia) (HCC)     hx of- no episodes in 3 yrs    SVT (supraventricular tachycardia) (HCC) 02/05/2018       Past Surgical History:   Procedure Laterality Date    AUGMENTATION MAMMAPLASTY Bilateral 2002    RETRO-GLANDULAR SALINE    AV NODE ABLATION  05/2017    BREAST CYST ASPIRATION  1970    ? breast    BREAST SURGERY      bilateral saline implants-right is ruptured    CATARACT EXTRACTION      COLONOSCOPY  2016    JOINT REPLACEMENT      left knee    KNEE ARTHROSCOPY Bilateral     IN ARTHRP KNE CONDYLE&PLATU MEDIAL&LAT COMPARTMENTS Left 9/24/2018    Procedure: ARTHROPLASTY KNEE TOTAL- Left;  Surgeon: Jaxon Zacarias DO;  Location: [facility];  Service: Orthopedics    IN ARTHRP KNE CONDYLE&PLATU MEDIAL&LAT COMPARTMENTS Right 10/14/2019    Procedure: ARTHROPLASTY KNEE TOTAL;  Surgeon: Jaxon Zacarias DO;  Location: [facility];  Service: Orthopedics    IN XCAPSL CTRC RMVL INSJ IO LENS PROSTH W/O ECP Right 8/1/2017    Procedure: EXTRACTION EXTRACAPSULAR CATARACT PHACO INTRAOCULAR LENS (IOL);  Surgeon: Singh Delaney MD;  Location: [facility] MAIN OR;  Service: Ophthalmology    IN XCAPSL CTRC RMVL INSJ IO LENS PROSTH W/O ECP Left 8/15/2017    Procedure: EXTRACTION EXTRACAPSULAR CATARACT PHACO INTRAOCULAR LENS (IOL);  Surgeon: Singh Delaney MD;  Location: [facility] MAIN OR;  Service: Ophthalmology    TONSILLECTOMY      TOTAL ABDOMINAL HYSTERECTOMY Bilateral 1991    age 46    TOTAL ABDOMINAL HYSTERECTOMY W/ BILATERAL SALPINGOOPHORECTOMY Bilateral 1991    age 46    UPPER GASTROINTESTINAL ENDOSCOPY         Family History   Problem Relation Age of Onset    Dementia Mother     Diabetes Mother     Aneurysm Father     Hypertension Brother     Thyroid disease Brother     Diabetes Brother      Prostate cancer Brother 74    Sudden death Brother     No Known Problems Daughter     No Known Problems Daughter     Breast cancer Maternal Grandmother 70    No Known Problems Maternal Grandfather     No Known Problems Paternal Grandmother     No Known Problems Paternal Grandfather     Cancer Paternal Aunt 82        unknown    Colon cancer Neg Hx     Ovarian cancer Neg Hx        Social History     Occupational History    Not on file   Tobacco Use    Smoking status: Former     Current packs/day: 0.00     Types: Cigarettes     Quit date: 1968     Years since quittin.3    Smokeless tobacco: Never   Vaping Use    Vaping status: Never Used   Substance and Sexual Activity    Alcohol use: Not Currently    Drug use: No    Sexual activity: Not Currently     Partners: Male         Current Outpatient Medications:     albuterol (Ventolin HFA) 90 mcg/act inhaler, Inhale 2 puffs every 4 (four) hours as needed for wheezing or shortness of breath, Disp: 18 g, Rfl: 0    Ascorbic Acid (C 1000 PO), Take 1 capsule by mouth daily, Disp: , Rfl:     Biotin 82772 MCG TABS, Take 1 tablet by mouth daily, Disp: , Rfl:     calcium carbonate (TUMS) 500 mg chewable tablet, Chew 1 tablet if needed for indigestion or heartburn, Disp: , Rfl:     Cholecalciferol (D3-1000 PO), Take 1 capsule by mouth daily, Disp: , Rfl:     famotidine (PEPCID) 40 MG tablet, Take 1 tablet (40 mg total) by mouth daily at bedtime, Disp: 90 tablet, Rfl: 3    Magnesium 400 MG CAPS, Take 1 capsule (400 mg total) by mouth in the morning, Disp: , Rfl:     metoprolol succinate (TOPROL-XL) 25 mg 24 hr tablet, Take 1 tablet (25 mg total) by mouth daily, Disp: 90 tablet, Rfl: 1    omeprazole (PriLOSEC) 40 MG capsule, Take 1 capsule (40 mg total) by mouth daily before breakfast, Disp: 90 capsule, Rfl: 0    rosuvastatin (CRESTOR) 5 mg tablet, Take 1 tablet (5 mg total) by mouth daily, Disp: 90 tablet, Rfl: 1    Semaglutide-Weight Management (Wegovy) 2.4 MG/0.75ML, Inject  2.4 mg under the skin weekly, Disp: 3 mL, Rfl: 3    vitamin E, tocopherol, 400 units capsule, Take 400 Units by mouth daily, Disp: , Rfl:     zinc gluconate 50 mg tablet, Take 50 mg by mouth daily, Disp: , Rfl:     amoxicillin (AMOXIL) 500 mg capsule, , Disp: , Rfl:     Allergies   Allergen Reactions    Other      Adhesive Tape-red, blisters    Medical Tape Hives       Objective:  There were no vitals filed for this visit.    Body mass index is 28.82 kg/m².    Right Knee Exam     Tenderness   The patient is experiencing no tenderness.     Range of Motion   Extension:  0   Flexion:  120     Tests   Varus: negative Valgus: negative  Drawer:  Anterior - negative    Posterior - negative    Other   Erythema: absent  Scars: present  Sensation: normal  Pulse: present  Swelling: none  Effusion: no effusion present    Comments:  Tender distal IT band  Well healed surgical scar  Stable at 0, 30 and 90 degrees  Neurovascularly in tact distally  No warmth or erythema        Right Hip Exam     Tenderness   The patient is experiencing tenderness in the lateral and greater trochanter.    Range of Motion   Abduction:  normal   Adduction:  normal   Flexion:  normal   External rotation:  normal   Internal rotation:  normal     Muscle Strength   Flexion: 5/5     Tests   CRISTIAN: negative    Other   Erythema: absent  Sensation: normal  Pulse: present          Observations     Right Knee   Negative for effusion.       Physical Exam  Vitals and nursing note reviewed.   Constitutional:       Appearance: Normal appearance. She is well-developed.   HENT:      Head: Normocephalic and atraumatic.      Right Ear: External ear normal.      Left Ear: External ear normal.   Eyes:      General: No scleral icterus.     Extraocular Movements: Extraocular movements intact.      Conjunctiva/sclera: Conjunctivae normal.   Cardiovascular:      Rate and Rhythm: Normal rate.   Pulmonary:      Effort: Pulmonary effort is normal. No respiratory distress.    Musculoskeletal:      Cervical back: Normal range of motion and neck supple.      Right knee: No effusion.      Comments: See Ortho exam   Skin:     General: Skin is warm and dry.   Neurological:      General: No focal deficit present.      Mental Status: She is alert and oriented to person, place, and time.   Psychiatric:         Behavior: Behavior normal.         I have personally reviewed pertinent films in PACS.    X-ray of the right knee obtained on 1/3/2025 reviewed demonstrating a well-positioned and aligned cemented total knee arthroplasty without evidence of failure.  There is no new fracture, dislocation, lytic or blastic lesion.    X-ray of the right hip obtained on 1/3/2025 reviewed demonstrating mild degenerative change with inferior narrowing and subtle sclerosis.  There is no acute fracture, dislocation, lytic or blastic lesion.    This document was created using speech voice recognition software.   Grammatical errors, random word insertions, pronoun errors, and incomplete sentences are an occasional consequence of this system due to software limitations, ambient noise, and hardware issues.   Any formal questions or concerns about content, text, or information contained within the body of this dictation should be directly addressed to the provider for clarification.

## 2025-01-08 PROCEDURE — 88305 TISSUE EXAM BY PATHOLOGIST: CPT | Performed by: STUDENT IN AN ORGANIZED HEALTH CARE EDUCATION/TRAINING PROGRAM

## 2025-01-08 PROCEDURE — 88341 IMHCHEM/IMCYTCHM EA ADD ANTB: CPT | Performed by: STUDENT IN AN ORGANIZED HEALTH CARE EDUCATION/TRAINING PROGRAM

## 2025-01-08 PROCEDURE — 88342 IMHCHEM/IMCYTCHM 1ST ANTB: CPT | Performed by: STUDENT IN AN ORGANIZED HEALTH CARE EDUCATION/TRAINING PROGRAM

## 2025-01-09 ENCOUNTER — LAB REQUISITION (OUTPATIENT)
Dept: LAB | Facility: HOSPITAL | Age: 80
End: 2025-01-09
Payer: COMMERCIAL

## 2025-01-09 DIAGNOSIS — D48.5 NEOPLASM OF UNCERTAIN BEHAVIOR OF SKIN: ICD-10-CM

## 2025-01-10 ENCOUNTER — EVALUATION (OUTPATIENT)
Dept: PHYSICAL THERAPY | Facility: CLINIC | Age: 80
End: 2025-01-10
Payer: COMMERCIAL

## 2025-01-10 DIAGNOSIS — Z96.651 S/P TOTAL KNEE ARTHROPLASTY, RIGHT: ICD-10-CM

## 2025-01-10 DIAGNOSIS — G89.29 CHRONIC PAIN OF RIGHT KNEE: ICD-10-CM

## 2025-01-10 DIAGNOSIS — M76.31 ILIOTIBIAL BAND SYNDROME, RIGHT: ICD-10-CM

## 2025-01-10 DIAGNOSIS — M25.561 CHRONIC PAIN OF RIGHT KNEE: ICD-10-CM

## 2025-01-10 DIAGNOSIS — M54.16 LUMBAR RADICULOPATHY: Primary | ICD-10-CM

## 2025-01-10 DIAGNOSIS — M16.11 PRIMARY OSTEOARTHRITIS OF RIGHT HIP: ICD-10-CM

## 2025-01-10 PROCEDURE — 97161 PT EVAL LOW COMPLEX 20 MIN: CPT | Performed by: PHYSICAL THERAPIST

## 2025-01-10 PROCEDURE — 97110 THERAPEUTIC EXERCISES: CPT | Performed by: PHYSICAL THERAPIST

## 2025-01-10 PROCEDURE — 97112 NEUROMUSCULAR REEDUCATION: CPT | Performed by: PHYSICAL THERAPIST

## 2025-01-10 NOTE — PROGRESS NOTES
PT Evaluation     Today's date: 1/10/2025  Patient name: Jeannine See  : 1945  MRN: [id number]  Referring provider: Jaxon Zacarias DO  Dx:   Encounter Diagnosis     ICD-10-CM    1. Lumbar radiculopathy  M54.16       2. Iliotibial band syndrome, right  M76.31 Ambulatory Referral to Physical Therapy      3. Primary osteoarthritis of right hip  M16.11 Ambulatory Referral to Physical Therapy      4. S/P total knee arthroplasty, right  Z96.651 Ambulatory Referral to Physical Therapy      5. Chronic pain of right knee  M25.561 Ambulatory Referral to Physical Therapy    G89.29                      Assessment  Impairments: abnormal gait, abnormal or restricted ROM, activity intolerance, difficulty understanding, impaired balance, impaired physical strength, lacks appropriate home exercise program, pain with function, poor posture , activity limitations and endurance    Assessment details: Pt presents with signs and symptoms synonymous of admitting diagnosis as well as possible mechanical diagnosis of lumbar radiculopathy with DP of LIZZY with table support.  Pt presents with pain, decreased strength, decreased range, flexibility, as well as tolerance to activity and postural awareness.  Pt would benefit skilled PT intervention in order to address these impairments in order to be able to perform all desired activities with minimal to nil symptom exacerbation.  Thank you very much for this referral.     Understanding of Dx/Px/POC: good     Prognosis: good    Goals  STG 4 Weeks:  Decrease pain at worst to 3  Improve range to LS to min  Improve strength to sustain 5/5 able to get in car and ascend steps 50% of time without pain  Independent with HEP  LTG 8 Weeks:  Decrease pain at worst to 1  Improve range to LS to nil  Improve strength to sustain 5/5 able to get in car and ascend steps 100% of time without pain  Able to perform all desired activities with minimal to nil symptom exacerbation      Plan  Patient  would benefit from: skilled physical therapy  Planned modality interventions: cryotherapy and thermotherapy: hydrocollator packs    Planned therapy interventions: joint mobilization, neuromuscular re-education, postural training, strengthening, stretching, therapeutic activities, therapeutic exercise, therapeutic training, transfer training, IADL retraining, home exercise program, graded motor, graded exercise, graded activity, gait training, functional ROM exercises, flexibility, abdominal trunk stabilization, activity modification and behavior modification    Frequency: 2x week  Duration in weeks: 10  Treatment plan discussed with: patient        Subjective Evaluation    History of Present Illness  Date of onset: 1/10/2025  Mechanism of injury: Pt is a 79 yofemale who presents today stating with insidious onset of intermittent R knee pain that travels up to her hip.  Pt reports no trauma upon initiation.  Pt reports first time she noticed it was maybe occurring after doing several steps in preparation for holiday displays.  Pt reports a lot of stairs to her basement, lifting, bending with the preparation.  Pt reports that it didn't really dissipate and actually progressively got worse.  Pt reports that she went to Dr. Zacarias who performed her R TKAs several years ago.  Imagery indicated that the hardware in her knees are okay, imagery of hip was (-) of abnormality.  Pt reports that she primarily works from home and does a lot of sitting for her work.  Pt reports that she was referred to PT and thus presents today.  Pt reports there are periods of time without pain.    Pt reports pain will occur with getting in car with lift.  No pain getting out. States sometimes pain with sit to stand.  Pt reports sustained sitting in chair is okay for up to an hour but then pain can travel up to low back even.  Standing is okay, walking usually is okay, but if there is pain and she proceeds to walk, her symptoms will remain, no  alleviate.  Pt reports no difficulty with bending forward to pick item up.  Pt reports sleeping is going okay, sometimes if she adjusts her R leg, there may be pain, but otherwise no complaints.  Pt reports goals are to reduce pain, improve ability to get into car, perform going up stairs, and improve ability to sit, and get back to how she was prior to initial onset.    L LE no issues at this time.  No recent change in bowel or bladder.    Pt reports that she does have a history of low back pain.   Quality of life: good    Patient Goals  Patient goals for therapy: increased strength, return to sport/leisure activities, increased motion and decreased pain    Pain  Current pain ratin  At best pain ratin  At worst pain ratin  Quality: dull ache  Relieving factors: change in position, rest and medications  Aggravating factors: sitting and stair climbing  Progression: improved      Diagnostic Tests  X-ray: normal  Treatments  Previous treatment: medication        Objective     Active Range of Motion     Lumbar   Flexion:  WFL  Extension:  Restriction level: moderate  Left lateral flexion:  with pain Restriction level: minimal  Right lateral flexion:  Restriction level: minimal    Additional Active Range of Motion Details  Forward head, rounded shoulders, Lordosis  B/L Sensation intact to L3,4,5,S1,S2  DTR [place] 1+ b/l [place] 1+ b/l  Postural correction  nil  Knee ROM   L 0-130   R 0-130 (mild irritation at end range)   Repeated motion   Flex  Ext LIZZY table support Improved LS range, sustained strength, improve stepping process.    SB R  SB L  LE Strength  Hip   L Flex Ext Abd Add all 5/5  R Flex Ext Abd Add all 5/5   LE Screen all 5/5.   Joint Mobility  Palpation  Sts  Step into car (replicated pain with chair and R LE)*.                Precautions: Asthma, Sjogren, Osteopenia, hx of PVC, subjective concern for falls.        Manuals 1/10                                                               "  Neuro Re-Ed             Education and progression 10 min            appCREAR, Bank account                                                                              Ther Ex             LIZZY 4 x 10            LIZZY table OP  4 x 10            LIZZY PVC                           Progression?                                       Slump Slider R  1\"            Ther Activity             LS ROM  better            Stepping up on chair (replicate Car) better            Stairs  Trial?                          Re-Check knee ROM symptoms             Modalities             CP/HP prn.                                "

## 2025-01-13 PROCEDURE — 88342 IMHCHEM/IMCYTCHM 1ST ANTB: CPT | Performed by: STUDENT IN AN ORGANIZED HEALTH CARE EDUCATION/TRAINING PROGRAM

## 2025-01-13 PROCEDURE — 88305 TISSUE EXAM BY PATHOLOGIST: CPT | Performed by: STUDENT IN AN ORGANIZED HEALTH CARE EDUCATION/TRAINING PROGRAM

## 2025-01-13 PROCEDURE — 88341 IMHCHEM/IMCYTCHM EA ADD ANTB: CPT | Performed by: STUDENT IN AN ORGANIZED HEALTH CARE EDUCATION/TRAINING PROGRAM

## 2025-01-14 ENCOUNTER — TELEPHONE (OUTPATIENT)
Dept: GASTROENTEROLOGY | Facility: CLINIC | Age: 80
End: 2025-01-14

## 2025-01-14 NOTE — TELEPHONE ENCOUNTER
Called and spoke with pt, offered OV with Dr Vega today instead of pt original appt in April. Pt denied but asked to be kept on a wait list as long as it isn't Tuesdays and thursdays

## 2025-01-15 ENCOUNTER — OFFICE VISIT (OUTPATIENT)
Dept: PHYSICAL THERAPY | Facility: CLINIC | Age: 80
End: 2025-01-15
Payer: COMMERCIAL

## 2025-01-15 DIAGNOSIS — M25.561 CHRONIC PAIN OF RIGHT KNEE: ICD-10-CM

## 2025-01-15 DIAGNOSIS — Z96.651 S/P TOTAL KNEE ARTHROPLASTY, RIGHT: ICD-10-CM

## 2025-01-15 DIAGNOSIS — M54.16 LUMBAR RADICULOPATHY: Primary | ICD-10-CM

## 2025-01-15 DIAGNOSIS — M16.11 PRIMARY OSTEOARTHRITIS OF RIGHT HIP: ICD-10-CM

## 2025-01-15 DIAGNOSIS — M76.31 ILIOTIBIAL BAND SYNDROME, RIGHT: ICD-10-CM

## 2025-01-15 DIAGNOSIS — G89.29 CHRONIC PAIN OF RIGHT KNEE: ICD-10-CM

## 2025-01-15 PROCEDURE — 97112 NEUROMUSCULAR REEDUCATION: CPT

## 2025-01-15 PROCEDURE — 97110 THERAPEUTIC EXERCISES: CPT

## 2025-01-15 NOTE — PROGRESS NOTES
"Daily Note     Today's date: 1/15/2025  Patient name: Jeannine See  : 1945  MRN: [id number]  Referring provider: Jaxon Zacarias DO  Dx:   Encounter Diagnosis     ICD-10-CM    1. Lumbar radiculopathy  M54.16       2. Iliotibial band syndrome, right  M76.31       3. Primary osteoarthritis of right hip  M16.11       4. S/P total knee arthroplasty, right  Z96.651       5. Chronic pain of right knee  M25.561     G89.29           Start Time: 1130  Stop Time: 1200  Total time in clinic (min): 30 minutes    Subjective: Pt reports intermittent R LE symptoms below the knee to her ankle at night. Compliant with HEP. No LBP or ITB pain to start.       Objective: See treatment diary below  Presents with R knee pain while walking.     Assessment: Tolerated treatment well. NE with repeated lumbar movements on R knee pain. Patient would benefit from continued PT      Plan: Continue per plan of care.      Precautions: Asthma, Sjogren, Osteopenia, hx of PVC, subjective concern for falls.        Manuals 1/10 01/15                                                               Neuro Re-Ed             Education and progression 10 min 8 min            Bent forward [employer], Bank account                                                                              Ther Ex             LIZZY 4 x 10            LIZZY table OP  4 x 10 4x10            LIZZY PVC   4x10            SGIS- R   2x10           Progression?                                       Slump Slider R  1\" x10            Ther Activity             LS ROM  better NE           Stepping up on chair (replicate Car) better NE           Stairs  Trial?  NE                        Re-Check knee ROM symptoms             Modalities             CP/HP prn.                                "

## 2025-01-17 ENCOUNTER — OFFICE VISIT (OUTPATIENT)
Dept: INTERNAL MEDICINE CLINIC | Facility: CLINIC | Age: 80
End: 2025-01-17
Payer: COMMERCIAL

## 2025-01-17 VITALS
HEIGHT: 65 IN | OXYGEN SATURATION: 96 % | TEMPERATURE: 98.8 F | BODY MASS INDEX: 28.09 KG/M2 | SYSTOLIC BLOOD PRESSURE: 120 MMHG | WEIGHT: 168.6 LBS | DIASTOLIC BLOOD PRESSURE: 70 MMHG | HEART RATE: 61 BPM

## 2025-01-17 DIAGNOSIS — R31.21 ASYMPTOMATIC MICROSCOPIC HEMATURIA: Primary | ICD-10-CM

## 2025-01-17 DIAGNOSIS — M35.00 SJOGREN'S SYNDROME, WITH UNSPECIFIED ORGAN INVOLVEMENT (HCC): ICD-10-CM

## 2025-01-17 DIAGNOSIS — E66.09 CLASS 1 OBESITY DUE TO EXCESS CALORIES WITHOUT SERIOUS COMORBIDITY WITH BODY MASS INDEX (BMI) OF 34.0 TO 34.9 IN ADULT: ICD-10-CM

## 2025-01-17 DIAGNOSIS — K21.00 GASTROESOPHAGEAL REFLUX DISEASE WITH ESOPHAGITIS WITHOUT HEMORRHAGE: ICD-10-CM

## 2025-01-17 DIAGNOSIS — E66.811 CLASS 1 OBESITY DUE TO EXCESS CALORIES WITHOUT SERIOUS COMORBIDITY WITH BODY MASS INDEX (BMI) OF 34.0 TO 34.9 IN ADULT: ICD-10-CM

## 2025-01-17 PROCEDURE — 99214 OFFICE O/P EST MOD 30 MIN: CPT | Performed by: INTERNAL MEDICINE

## 2025-01-17 NOTE — ASSESSMENT & PLAN NOTE
Tolerating Wegovy 2.4 mg  She continues to have some reflux which she had even before week OV  Taking famotidine daily and omeprazole as needed

## 2025-01-17 NOTE — PROGRESS NOTES
Name: Jeannine See      : 1945      MRN: [id number]  Encounter Provider: Lea Reyes, MD  Encounter Date: 2025   Encounter department: [facility]    Assessment & Plan  Asymptomatic microscopic hematuria  Had this done through her GYN in October with RBCs WBCs 1+ protein  No growth on culture  Discussed repeating  Orders:  •  UA w Reflex to Microscopic w Reflex to Culture; Future    Class 1 obesity due to excess calories without serious comorbidity with body mass index (BMI) of 34.0 to 34.9 in adult  Tolerating Wegovy 2.4 mg  She continues to have some reflux which she had even before week OV  Taking famotidine daily and omeprazole as needed       Sjogren's syndrome, with unspecified organ involvement ([facility])  Her rheumatologist retired so we will add sed rate CRP to next labs  Orders:  •  Sedimentation rate, automated; Future  •  C-reactive protein; Future    Gastroesophageal reflux disease with esophagitis without hemorrhage  Continue famotidine            History of Present Illness     Here for follow-up  Feels well overall  Recent excision of lesion on her nose, basal cell on pathology  Doing well on Wegovy 2.4 mg weekly  All in all has lost 25+ pounds      Review of Systems   Respiratory:  Negative for shortness of breath.    Cardiovascular:  Negative for chest pain and palpitations.   Gastrointestinal:  Negative for constipation and diarrhea.   Genitourinary:  Negative for dysuria, hematuria and urgency.   Neurological:  Negative for headaches.     Past Medical History:   Diagnosis Date   • Acute bronchitis 2020   • Aftercare following left knee joint replacement surgery 2018   • Arthritis    • Arthritis 2018   • Asthma    • AVNRT (AV manny re-entry tachycardia) ([facility]) 2018   • Bronchitis 2020   • Chest pain 2018   • Chronic diarrhea 2021   • Chronic pain of right knee 2018   • Conjunctivitis 2020   • Contact  lens/glasses fitting    • COVID-19 virus infection 12/15/2020   • Encounter for screening mammogram for malignant neoplasm of breast 12/02/2022   • GERD (gastroesophageal reflux disease)    • Gross hematuria 10/02/2018   • Headache(784.0)    • History of hysterectomy 12/02/2022   • Hypoestrogenism 12/02/2022   • Left knee pain 08/07/2018   • Mixed hyperlipidemia 10/01/2019   • Moderate osteopenia 06/24/2016   • Obesity    • Osteoarthritis of knee 11/16/2016   • Paroxysmal supraventricular tachycardia (HCC) 12/05/2013   • Primary osteoarthritis of left knee 06/03/2020   • Primary osteoarthritis of right knee 07/27/2018   • S/P total knee replacement using cement, left 09/24/2018   • Sjogren's syndrome (HCC)    • Status post total knee replacement using cement, right 10/14/2019   • SVT (supraventricular tachycardia) (HCC)     hx of- no episodes in 3 yrs   • SVT (supraventricular tachycardia) (HCC) 02/05/2018     Past Surgical History:   Procedure Laterality Date   • AUGMENTATION MAMMAPLASTY Bilateral 2002    RETRO-GLANDULAR SALINE   • AV NODE ABLATION  05/2017   • BREAST CYST ASPIRATION  1970    ? breast   • BREAST SURGERY      bilateral saline implants-right is ruptured   • CATARACT EXTRACTION     • COLONOSCOPY  2016   • JOINT REPLACEMENT      left knee   • KNEE ARTHROSCOPY Bilateral    • RI ARTHRP KNE CONDYLE&PLATU MEDIAL&LAT COMPARTMENTS Left 09/24/2018    Procedure: ARTHROPLASTY KNEE TOTAL- Left;  Surgeon: Jaxon Zacarias DO;  Location: [facility] MAIN OR;  Service: Orthopedics   • RI ARTHRP KNE CONDYLE&PLATU MEDIAL&LAT COMPARTMENTS Right 10/14/2019    Procedure: ARTHROPLASTY KNEE TOTAL;  Surgeon: Jaxon Zacarias DO;  Location: [facility];  Service: Orthopedics   • RI XCAPSL CTRC RMVL INSJ IO LENS PROSTH W/O ECP Right 08/01/2017    Procedure: EXTRACTION EXTRACAPSULAR CATARACT PHACO INTRAOCULAR LENS (IOL);  Surgeon: Singh Delaney MD;  Location: [facility] MAIN OR;  Service: Ophthalmology   • RI XCAPSL CTRC RMVL INSJ IO LENS  PROSTH W/O ECP Left 08/15/2017    Procedure: EXTRACTION EXTRACAPSULAR CATARACT PHACO INTRAOCULAR LENS (IOL);  Surgeon: Singh Delaney MD;  Location: [facility] MAIN OR;  Service: Ophthalmology   • TONSILLECTOMY     • TOTAL ABDOMINAL HYSTERECTOMY Bilateral     age 46   • TOTAL ABDOMINAL HYSTERECTOMY W/ BILATERAL SALPINGOOPHORECTOMY Bilateral     age 46   • TUBAL LIGATION     • UPPER GASTROINTESTINAL ENDOSCOPY       Family History   Problem Relation Age of Onset   • Dementia Mother    • Diabetes Mother    • Aneurysm Father    • Hypertension Brother    • Thyroid disease Brother    • Diabetes Brother    • Prostate cancer Brother 74   • Sudden death Brother    • No Known Problems Daughter    • No Known Problems Daughter    • Breast cancer Maternal Grandmother 70   • No Known Problems Maternal Grandfather    • No Known Problems Paternal Grandmother    • No Known Problems Paternal Grandfather    • Cancer Paternal Aunt 82        unknown   • Colon cancer Neg Hx    • Ovarian cancer Neg Hx      Social History     Tobacco Use   • Smoking status: Former     Current packs/day: 0.00     Types: Cigarettes     Quit date: 1968     Years since quittin.3   • Smokeless tobacco: Never   Vaping Use   • Vaping status: Never Used   Substance and Sexual Activity   • Alcohol use: Not Currently   • Drug use: No   • Sexual activity: Not Currently     Partners: Male     Current Outpatient Medications on File Prior to Visit   Medication Sig   • albuterol (Ventolin HFA) 90 mcg/act inhaler Inhale 2 puffs every 4 (four) hours as needed for wheezing or shortness of breath   • Ascorbic Acid (C 1000 PO) Take 1 capsule by mouth daily   • Biotin 87225 MCG TABS Take 1 tablet by mouth daily   • calcium carbonate (TUMS) 500 mg chewable tablet Chew 1 tablet if needed for indigestion or heartburn   • Cholecalciferol (D3-1000 PO) Take 1 capsule by mouth daily   • famotidine (PEPCID) 40 MG tablet Take 1 tablet (40 mg total) by mouth daily at  "bedtime   • Magnesium 400 MG CAPS Take 1 capsule (400 mg total) by mouth in the morning   • metoprolol succinate (TOPROL-XL) 25 mg 24 hr tablet Take 1 tablet (25 mg total) by mouth daily   • omeprazole (PriLOSEC) 40 MG capsule Take 1 capsule (40 mg total) by mouth daily before breakfast   • rosuvastatin (CRESTOR) 5 mg tablet Take 1 tablet (5 mg total) by mouth daily   • Semaglutide-Weight Management (Wegovy) 2.4 MG/0.75ML Inject 2.4 mg under the skin weekly   • vitamin E, tocopherol, 400 units capsule Take 400 Units by mouth daily   • zinc gluconate 50 mg tablet Take 50 mg by mouth daily   • amoxicillin (AMOXIL) 500 mg capsule  (Patient not taking: Reported on 12/18/2024)     Allergies   Allergen Reactions   • Other      Adhesive Tape-red, blisters   • Medical Tape Hives     Immunization History   Administered Date(s) Administered   • COVID-19 PFIZER VACCINE 0.3 ML IM 03/18/2021, 04/08/2021, 12/04/2021   • H1N1, All Formulations 11/10/2009   • INFLUENZA 10/13/2004   • Influenza Split High Dose Preservative Free IM 10/17/2018, 10/02/2024   • Influenza, seasonal, injectable 11/14/2014   • Pneumococcal Conjugate 13-Valent 12/16/2015   • Pneumococcal Polysaccharide PPV23 11/09/2016   • Respiratory Syncytial Virus Vaccine (Recombinant) 10/02/2024   • Td (adult), Unspecified 12/13/2004   • Tdap 09/12/2022   • Zoster 12/09/2014   • Zoster Vaccine Recombinant 04/26/2019, 09/03/2019     Objective   /70 (BP Location: Left arm, Patient Position: Sitting, Cuff Size: Large)   Pulse 61   Temp 98.8 °F (37.1 °C) (Tympanic)   Ht 5' 5\" (1.651 m)   Wt 76.5 kg (168 lb 9.6 oz)   SpO2 96%   BMI 28.06 kg/m²     Physical Exam  Constitutional:       General: She is not in acute distress.     Appearance: She is well-developed. She is not ill-appearing, toxic-appearing or diaphoretic.   HENT:      Nose:     Eyes:      Conjunctiva/sclera: Conjunctivae normal.   Cardiovascular:      Rate and Rhythm: Normal rate and regular rhythm. "      Heart sounds: Normal heart sounds. No murmur heard.  Pulmonary:      Effort: Pulmonary effort is normal. No respiratory distress.      Breath sounds: Normal breath sounds. No stridor. No wheezing or rales.   Abdominal:      General: There is no distension.      Palpations: Abdomen is soft. There is no mass.      Tenderness: There is no abdominal tenderness. There is no guarding or rebound.   Musculoskeletal:      Cervical back: Neck supple.      Right lower leg: No edema.      Left lower leg: No edema.   Neurological:      Mental Status: She is alert and oriented to person, place, and time.   Psychiatric:         Mood and Affect: Mood normal.         Behavior: Behavior normal.         Thought Content: Thought content normal.         Judgment: Judgment normal.

## 2025-01-17 NOTE — ASSESSMENT & PLAN NOTE
Her rheumatologist retired so we will add sed rate CRP to next labs  Orders:  •  Sedimentation rate, automated; Future  •  C-reactive protein; Future

## 2025-01-20 ENCOUNTER — OFFICE VISIT (OUTPATIENT)
Dept: PHYSICAL THERAPY | Facility: CLINIC | Age: 80
End: 2025-01-20
Payer: COMMERCIAL

## 2025-01-20 ENCOUNTER — APPOINTMENT (OUTPATIENT)
Dept: LAB | Facility: CLINIC | Age: 80
End: 2025-01-20
Payer: COMMERCIAL

## 2025-01-20 DIAGNOSIS — M25.561 CHRONIC PAIN OF RIGHT KNEE: ICD-10-CM

## 2025-01-20 DIAGNOSIS — M16.11 PRIMARY OSTEOARTHRITIS OF RIGHT HIP: ICD-10-CM

## 2025-01-20 DIAGNOSIS — M76.31 ILIOTIBIAL BAND SYNDROME, RIGHT: ICD-10-CM

## 2025-01-20 DIAGNOSIS — G89.29 CHRONIC PAIN OF RIGHT KNEE: ICD-10-CM

## 2025-01-20 DIAGNOSIS — M54.16 LUMBAR RADICULOPATHY: Primary | ICD-10-CM

## 2025-01-20 DIAGNOSIS — Z96.651 S/P TOTAL KNEE ARTHROPLASTY, RIGHT: ICD-10-CM

## 2025-01-20 DIAGNOSIS — R31.21 ASYMPTOMATIC MICROSCOPIC HEMATURIA: ICD-10-CM

## 2025-01-20 LAB
BACTERIA UR QL AUTO: ABNORMAL /HPF
BILIRUB UR QL STRIP: NEGATIVE
CLARITY UR: CLEAR
COLOR UR: ABNORMAL
GLUCOSE UR STRIP-MCNC: NEGATIVE MG/DL
HGB UR QL STRIP.AUTO: NEGATIVE
KETONES UR STRIP-MCNC: NEGATIVE MG/DL
LEUKOCYTE ESTERASE UR QL STRIP: ABNORMAL
NITRITE UR QL STRIP: NEGATIVE
NON-SQ EPI CELLS URNS QL MICRO: ABNORMAL /HPF
PH UR STRIP.AUTO: 5.5 [PH]
PROT UR STRIP-MCNC: NEGATIVE MG/DL
RBC #/AREA URNS AUTO: ABNORMAL /HPF
SP GR UR STRIP.AUTO: 1.02 (ref 1–1.03)
UROBILINOGEN UR STRIP-ACNC: <2 MG/DL
WBC #/AREA URNS AUTO: ABNORMAL /HPF

## 2025-01-20 PROCEDURE — 87086 URINE CULTURE/COLONY COUNT: CPT

## 2025-01-20 PROCEDURE — 97110 THERAPEUTIC EXERCISES: CPT | Performed by: PHYSICAL THERAPIST

## 2025-01-20 PROCEDURE — 97112 NEUROMUSCULAR REEDUCATION: CPT | Performed by: PHYSICAL THERAPIST

## 2025-01-20 PROCEDURE — 81001 URINALYSIS AUTO W/SCOPE: CPT

## 2025-01-20 NOTE — PROGRESS NOTES
"Daily Note     Today's date: 2025  Patient name: Jeannine See  : 1945  MRN: [id number]  Referring provider: Jaxon Zacarias DO  Dx:   Encounter Diagnosis     ICD-10-CM    1. Lumbar radiculopathy  M54.16       2. Iliotibial band syndrome, right  M76.31       3. Primary osteoarthritis of right hip  M16.11       4. S/P total knee arthroplasty, right  Z96.651       5. Chronic pain of right knee  M25.561     G89.29                      Subjective: Pt presents today stating that her low back is feeling quite well, but the knee is still bothering her when she is getting in and out of car and then also walking for any distance.        Objective: See treatment diary below  Pain with Knee Flex OP on R.  + [place] 90 b/l.    Pain with walking, pain with elevating R knee into car.      Repeated knee flexion better with walking, better with R knee into car.    Assessment: Repeated knee flexion and R Rectus Stretching in prone improved ability to walk and initiate in and out of car movements.  HEP updated and pt was in accord with plan of care moving forward.       Plan: Continue per plan of care.      Precautions: Asthma, Sjogren, Osteopenia, hx of PVC, subjective concern for falls.        Manuals 1/10 01/15 1/20          Prone RF [facility]     TAS                                                 Neuro Re-Ed             Education and progression 10 min 8 min  8 min assessment  and education          [employer], Bank account                                                                              Ther Ex             LIZZY 4 x 10            LIZZY table OP  4 x 10 4x10  2x 10          LIZZY PVC   4x10  2 x 10          SGIS- R   2x10                                                  Slump Slider R  1\" x10  1\" x 10          Ther Activity             LS ROM  better NE           Stepping up on chair (replicate Car) better NE better          Stairs  Trial?  NE FF                       Re-Check knee ROM symptoms   " Improved with repeated knee flexion          Modalities             CP/HP prn.

## 2025-01-21 LAB — BACTERIA UR CULT: NORMAL

## 2025-01-22 ENCOUNTER — RESULTS FOLLOW-UP (OUTPATIENT)
Dept: INTERNAL MEDICINE CLINIC | Facility: CLINIC | Age: 80
End: 2025-01-22

## 2025-01-27 ENCOUNTER — EVALUATION (OUTPATIENT)
Dept: PHYSICAL THERAPY | Facility: CLINIC | Age: 80
End: 2025-01-27
Payer: COMMERCIAL

## 2025-01-27 DIAGNOSIS — Z96.651 S/P TOTAL KNEE ARTHROPLASTY, RIGHT: ICD-10-CM

## 2025-01-27 DIAGNOSIS — M54.16 LUMBAR RADICULOPATHY: Primary | ICD-10-CM

## 2025-01-27 DIAGNOSIS — M25.561 CHRONIC PAIN OF RIGHT KNEE: ICD-10-CM

## 2025-01-27 DIAGNOSIS — M76.31 ILIOTIBIAL BAND SYNDROME, RIGHT: ICD-10-CM

## 2025-01-27 DIAGNOSIS — G89.29 CHRONIC PAIN OF RIGHT KNEE: ICD-10-CM

## 2025-01-27 DIAGNOSIS — M16.11 PRIMARY OSTEOARTHRITIS OF RIGHT HIP: ICD-10-CM

## 2025-01-27 PROCEDURE — 97110 THERAPEUTIC EXERCISES: CPT | Performed by: PHYSICAL THERAPIST

## 2025-01-27 PROCEDURE — 97140 MANUAL THERAPY 1/> REGIONS: CPT | Performed by: PHYSICAL THERAPIST

## 2025-01-27 NOTE — PROGRESS NOTES
"Daily Note     Today's date: 2025  Patient name: Jeannine See  : 1945  MRN: [id number]  Referring provider: Jaxon Zacarias DO  Dx:   Encounter Diagnosis     ICD-10-CM    1. Lumbar radiculopathy  M54.16       2. Iliotibial band syndrome, right  M76.31       3. Primary osteoarthritis of right hip  M16.11       4. Chronic pain of right knee  M25.561     G89.29       5. S/P total knee arthroplasty, right  Z96.651                      Subjective: Pt presents today stating that she is continuing find improvement with walking and getting in and out of car.  Content with progression.        Objective: See treatment diary below      Assessment:  Responding positively to R knee flexion intervention and RF stretches.  Trial different RF stretches that pt can be successful with performing at home, pt in accord.  Continue to progress as able, RE n.v.       Plan: Continue per plan of care.      Precautions: Asthma, Sjogren, Osteopenia, hx of PVC, subjective concern for falls.        Manuals 1/10 01/15 1/20 1/27         Prone RF ST R.     TAS TAS                                                Neuro Re-Ed             Education and progression 10 min 8 min  8 min assessment  and education 8 min         [employer], Bank account                                                                              Ther Ex             LIZZY 4 x 10            LIZZY table OP  4 x 10 4x10  2x 10 2 x 10         LIZZY PVC   4x10  2 x 10 2 x 10         SGIS- R   2x10                                                  Slump Slider R  1\" x10  1\" x 10 1\" x 10         Ther Activity             LS ROM  better NE           Stepping up on chair (replicate Car) better NE better better         Stairs  Trial?  NE FF FF                      Re-Check knee ROM symptoms   Improved with repeated knee flexion          Modalities             CP/HP prn.                                "

## 2025-02-03 ENCOUNTER — EVALUATION (OUTPATIENT)
Dept: PHYSICAL THERAPY | Facility: CLINIC | Age: 80
End: 2025-02-03
Payer: COMMERCIAL

## 2025-02-03 DIAGNOSIS — Z96.651 S/P TOTAL KNEE ARTHROPLASTY, RIGHT: ICD-10-CM

## 2025-02-03 DIAGNOSIS — M76.31 ILIOTIBIAL BAND SYNDROME, RIGHT: ICD-10-CM

## 2025-02-03 DIAGNOSIS — M54.16 LUMBAR RADICULOPATHY: Primary | ICD-10-CM

## 2025-02-03 DIAGNOSIS — M16.11 PRIMARY OSTEOARTHRITIS OF RIGHT HIP: ICD-10-CM

## 2025-02-03 DIAGNOSIS — M25.561 CHRONIC PAIN OF RIGHT KNEE: ICD-10-CM

## 2025-02-03 DIAGNOSIS — G89.29 CHRONIC PAIN OF RIGHT KNEE: ICD-10-CM

## 2025-02-03 PROCEDURE — 97110 THERAPEUTIC EXERCISES: CPT | Performed by: PHYSICAL THERAPIST

## 2025-02-03 PROCEDURE — 97140 MANUAL THERAPY 1/> REGIONS: CPT | Performed by: PHYSICAL THERAPIST

## 2025-02-03 NOTE — PROGRESS NOTES
PT Evaluation     Today's date: 2/3/2025  Patient name: Jeannine See  : 1945  MRN: [id number]  Referring provider: Jaxon Zacarias DO  Dx:   Encounter Diagnosis     ICD-10-CM    1. Lumbar radiculopathy  M54.16       2. Iliotibial band syndrome, right  M76.31       3. Primary osteoarthritis of right hip  M16.11       4. S/P total knee arthroplasty, right  Z96.651       5. Chronic pain of right knee  M25.561     G89.29                      Assessment  Impairments: abnormal gait, abnormal or restricted ROM, activity intolerance, difficulty understanding, impaired balance, impaired physical strength, lacks appropriate home exercise program, pain with function, poor posture , activity limitations and endurance    Assessment details: Pt at this time demonstrates improved strength, range, flexibility as well as overall tolerance to activity.  Pt at this time has achieved all goals sought out for her as well as by her.  If she is to have a decline in any form she is welcome to return as needed.  Thank you very much for this kind, motivated and familiar referral.      Understanding of Dx/Px/POC: good     Prognosis: good    Goals  STG 4 Weeks:  Decrease pain at worst to 3 -met  Improve range to LS to min -met  Improve strength to sustain 5/5 able to get in car and ascend steps 50% of time without pain -met  Independent with HEP -met  LTG 8 Weeks:  Decrease pain at worst to 1 -met  Improve range to LS to nil -met  Improve strength to sustain 5/5 able to get in car and ascend steps 100% of time without pain -met  Able to perform all desired activities with minimal to nil symptom exacerbation -met       Plan  Planned modality interventions: cryotherapy and thermotherapy: hydrocollator packs    Planned therapy interventions: joint mobilization, neuromuscular re-education, postural training, strengthening, stretching, therapeutic activities, therapeutic exercise, therapeutic training, transfer training, IADL  retraining, home exercise program, graded motor, graded exercise, graded activity, gait training, functional ROM exercises, flexibility, abdominal trunk stabilization, activity modification and behavior modification    Duration in weeks: 6  Treatment plan discussed with: patient        Subjective Evaluation    History of Present Illness  Date of onset: 1/10/2025  Mechanism of injury: Pt presents today stating that she is feeling as a whole better.  Pt reports that she is having pain at worst 1-2/10 but quite in frequently.  Pt reports that getting in to a car has improved, sitting for long periods of time, standing, walking and stairs are all going well too.  Pt reports that she is content with her progression and feels as though she is performing HEP at a consistent rate.  Ready to be DC to HEP.  Follows up with Dr. Zacarias on 3/7/25.    Quality of life: good    Patient Goals  Patient goals for therapy: increased strength, return to sport/leisure activities, increased motion and decreased pain    Pain  Current pain ratin  At best pain ratin  At worst pain ratin  Quality: dull ache  Relieving factors: change in position, rest and medications  Aggravating factors: sitting and stair climbing  Progression: improved      Diagnostic Tests  X-ray: normal  Treatments  Previous treatment: medication        Objective     Active Range of Motion     Lumbar   Flexion:  WFL  Extension:  WFL  Left lateral flexion:  WFL  Right lateral flexion:  WFL    Additional Active Range of Motion Details  Forward head, rounded shoulders, Lordosis  B/L Sensation intact to L3,4,5,S1,S2  DTR [place] 1+ b/l [place] 1+ b/l  Postural correction  nil  Knee ROM   L 0-130   R 0-130 (mild irritation at end range)   Repeated motion   Flex  Ext LIZZY table support Improved LS range, sustained strength, improve stepping process.    SB R  SB L  LE Strength  Hip   L Flex Ext Abd Add all 5/5  R Flex Ext Abd Add all 5/5   LE Screen all 5/5.   Joint  "Mobility  Palpation  Sts  Step into car (replicated pain with chair and R LE)*.  Nil symptoms.                 Precautions: Asthma, Sjogren, Osteopenia, hx of PVC, subjective concern for falls.        Manuals 1/10 01/15 1/20 1/27 2/3        Prone RF ST R.     TAS TAS TAS                                               Neuro Re-Ed             Education and progression 10 min 8 min  8 min assessment  and education 8 min 8 min assessment        [employer], Bank account                                                                              Ther Ex             LIZZY 4 x 10            LIZZY table OP  4 x 10 4x10  2x 10 2 x 10 2 x 10        LIZZY PVC   4x10  2 x 10 2 x 10 2 x 10        SGIS- R   2x10                                                  Slump Slider R  1\" x10  1\" x 10 1\" x 10 1 \" x 10        Ther Activity             LS ROM  better NE           Stepping up on chair (replicate Car) better NE better better nil        Stairs  Trial?  NE FF FF FF                     Re-Check knee ROM symptoms   Improved with repeated knee flexion          Modalities             CP/HP prn.                                "

## 2025-02-25 DIAGNOSIS — E66.811 CLASS 1 OBESITY DUE TO EXCESS CALORIES WITHOUT SERIOUS COMORBIDITY WITH BODY MASS INDEX (BMI) OF 34.0 TO 34.9 IN ADULT: ICD-10-CM

## 2025-02-25 DIAGNOSIS — E66.09 CLASS 1 OBESITY DUE TO EXCESS CALORIES WITHOUT SERIOUS COMORBIDITY WITH BODY MASS INDEX (BMI) OF 34.0 TO 34.9 IN ADULT: ICD-10-CM

## 2025-02-27 RX ORDER — SEMAGLUTIDE 2.4 MG/.75ML
INJECTION, SOLUTION SUBCUTANEOUS
Qty: 9 ML | Refills: 1 | Status: SHIPPED | OUTPATIENT
Start: 2025-02-27

## 2025-03-09 DIAGNOSIS — E78.00 PURE HYPERCHOLESTEROLEMIA: ICD-10-CM

## 2025-03-09 DIAGNOSIS — I49.3 PVC (PREMATURE VENTRICULAR CONTRACTION): ICD-10-CM

## 2025-03-09 DIAGNOSIS — K21.00 GASTROESOPHAGEAL REFLUX DISEASE WITH ESOPHAGITIS WITHOUT HEMORRHAGE: ICD-10-CM

## 2025-03-11 RX ORDER — ROSUVASTATIN CALCIUM 5 MG/1
5 TABLET, COATED ORAL DAILY
Qty: 90 TABLET | Refills: 0 | Status: SHIPPED | OUTPATIENT
Start: 2025-03-11

## 2025-03-11 RX ORDER — OMEPRAZOLE 40 MG/1
40 CAPSULE, DELAYED RELEASE ORAL
Qty: 90 CAPSULE | Refills: 0 | Status: SHIPPED | OUTPATIENT
Start: 2025-03-11

## 2025-03-11 RX ORDER — METOPROLOL SUCCINATE 25 MG/1
25 TABLET, EXTENDED RELEASE ORAL DAILY
Qty: 90 TABLET | Refills: 0 | Status: SHIPPED | OUTPATIENT
Start: 2025-03-11

## 2025-04-04 DIAGNOSIS — E66.811 CLASS 1 OBESITY DUE TO EXCESS CALORIES WITHOUT SERIOUS COMORBIDITY WITH BODY MASS INDEX (BMI) OF 34.0 TO 34.9 IN ADULT: ICD-10-CM

## 2025-04-04 DIAGNOSIS — E66.09 CLASS 1 OBESITY DUE TO EXCESS CALORIES WITHOUT SERIOUS COMORBIDITY WITH BODY MASS INDEX (BMI) OF 34.0 TO 34.9 IN ADULT: ICD-10-CM

## 2025-04-04 RX ORDER — SEMAGLUTIDE 2.4 MG/.75ML
INJECTION, SOLUTION SUBCUTANEOUS
Qty: 9 ML | Refills: 1 | Status: SHIPPED | OUTPATIENT
Start: 2025-04-04

## 2025-04-07 ENCOUNTER — APPOINTMENT (OUTPATIENT)
Dept: LAB | Facility: CLINIC | Age: 80
End: 2025-04-07
Payer: COMMERCIAL

## 2025-04-07 DIAGNOSIS — M35.00 SJOGREN'S SYNDROME, WITH UNSPECIFIED ORGAN INVOLVEMENT (HCC): ICD-10-CM

## 2025-04-07 DIAGNOSIS — E78.00 PURE HYPERCHOLESTEROLEMIA: ICD-10-CM

## 2025-04-07 DIAGNOSIS — Z13.220 SCREENING, LIPID: ICD-10-CM

## 2025-04-07 DIAGNOSIS — Z13.1 SCREENING FOR DIABETES MELLITUS: ICD-10-CM

## 2025-04-07 LAB
ALBUMIN SERPL BCG-MCNC: 4.1 G/DL (ref 3.5–5)
ALP SERPL-CCNC: 78 U/L (ref 34–104)
ALT SERPL W P-5'-P-CCNC: 12 U/L (ref 7–52)
ANION GAP SERPL CALCULATED.3IONS-SCNC: 5 MMOL/L (ref 4–13)
AST SERPL W P-5'-P-CCNC: 17 U/L (ref 13–39)
BASOPHILS # BLD AUTO: 0.05 THOUSANDS/ÂΜL (ref 0–0.1)
BASOPHILS NFR BLD AUTO: 1 % (ref 0–1)
BILIRUB SERPL-MCNC: 0.74 MG/DL (ref 0.2–1)
BUN SERPL-MCNC: 14 MG/DL (ref 5–25)
CALCIUM SERPL-MCNC: 9.5 MG/DL (ref 8.4–10.2)
CHLORIDE SERPL-SCNC: 108 MMOL/L (ref 96–108)
CHOLEST SERPL-MCNC: 151 MG/DL (ref ?–200)
CO2 SERPL-SCNC: 28 MMOL/L (ref 21–32)
CREAT SERPL-MCNC: 0.72 MG/DL (ref 0.6–1.3)
CRP SERPL QL: 1 MG/L
EOSINOPHIL # BLD AUTO: 0.17 THOUSAND/ÂΜL (ref 0–0.61)
EOSINOPHIL NFR BLD AUTO: 3 % (ref 0–6)
ERYTHROCYTE [DISTWIDTH] IN BLOOD BY AUTOMATED COUNT: 14.3 % (ref 11.6–15.1)
ERYTHROCYTE [SEDIMENTATION RATE] IN BLOOD: 19 MM/HOUR (ref 0–29)
EST. AVERAGE GLUCOSE BLD GHB EST-MCNC: 117 MG/DL
GFR SERPL CREATININE-BSD FRML MDRD: 80 ML/MIN/1.73SQ M
GLUCOSE P FAST SERPL-MCNC: 84 MG/DL (ref 65–99)
HBA1C MFR BLD: 5.7 %
HCT VFR BLD AUTO: 45.1 % (ref 34.8–46.1)
HDLC SERPL-MCNC: 69 MG/DL
HGB BLD-MCNC: 14.9 G/DL (ref 11.5–15.4)
IMM GRANULOCYTES # BLD AUTO: 0.01 THOUSAND/UL (ref 0–0.2)
IMM GRANULOCYTES NFR BLD AUTO: 0 % (ref 0–2)
LDLC SERPL CALC-MCNC: 64 MG/DL (ref 0–100)
LYMPHOCYTES # BLD AUTO: 2.56 THOUSANDS/ÂΜL (ref 0.6–4.47)
LYMPHOCYTES NFR BLD AUTO: 47 % (ref 14–44)
MCH RBC QN AUTO: 28.9 PG (ref 26.8–34.3)
MCHC RBC AUTO-ENTMCNC: 33 G/DL (ref 31.4–37.4)
MCV RBC AUTO: 87 FL (ref 82–98)
MONOCYTES # BLD AUTO: 0.55 THOUSAND/ÂΜL (ref 0.17–1.22)
MONOCYTES NFR BLD AUTO: 10 % (ref 4–12)
NEUTROPHILS # BLD AUTO: 2.17 THOUSANDS/ÂΜL (ref 1.85–7.62)
NEUTS SEG NFR BLD AUTO: 39 % (ref 43–75)
NONHDLC SERPL-MCNC: 82 MG/DL
NRBC BLD AUTO-RTO: 0 /100 WBCS
PLATELET # BLD AUTO: 222 THOUSANDS/UL (ref 149–390)
PMV BLD AUTO: 10.2 FL (ref 8.9–12.7)
POTASSIUM SERPL-SCNC: 3.9 MMOL/L (ref 3.5–5.3)
PROT SERPL-MCNC: 6.8 G/DL (ref 6.4–8.4)
RBC # BLD AUTO: 5.16 MILLION/UL (ref 3.81–5.12)
SODIUM SERPL-SCNC: 141 MMOL/L (ref 135–147)
TRIGL SERPL-MCNC: 88 MG/DL (ref ?–150)
WBC # BLD AUTO: 5.51 THOUSAND/UL (ref 4.31–10.16)

## 2025-04-07 PROCEDURE — 36415 COLL VENOUS BLD VENIPUNCTURE: CPT

## 2025-04-07 PROCEDURE — 85025 COMPLETE CBC W/AUTO DIFF WBC: CPT

## 2025-04-07 PROCEDURE — 86140 C-REACTIVE PROTEIN: CPT

## 2025-04-07 PROCEDURE — 85652 RBC SED RATE AUTOMATED: CPT

## 2025-04-07 PROCEDURE — 80061 LIPID PANEL: CPT

## 2025-04-07 PROCEDURE — 80053 COMPREHEN METABOLIC PANEL: CPT

## 2025-04-07 PROCEDURE — 83036 HEMOGLOBIN GLYCOSYLATED A1C: CPT

## 2025-04-16 ENCOUNTER — OFFICE VISIT (OUTPATIENT)
Dept: INTERNAL MEDICINE CLINIC | Facility: CLINIC | Age: 80
End: 2025-04-16
Payer: COMMERCIAL

## 2025-04-16 VITALS
SYSTOLIC BLOOD PRESSURE: 118 MMHG | HEIGHT: 65 IN | DIASTOLIC BLOOD PRESSURE: 82 MMHG | WEIGHT: 170.6 LBS | BODY MASS INDEX: 28.42 KG/M2 | HEART RATE: 60 BPM | OXYGEN SATURATION: 99 %

## 2025-04-16 DIAGNOSIS — E66.09 CLASS 1 OBESITY DUE TO EXCESS CALORIES WITHOUT SERIOUS COMORBIDITY WITH BODY MASS INDEX (BMI) OF 34.0 TO 34.9 IN ADULT: ICD-10-CM

## 2025-04-16 DIAGNOSIS — Z00.00 ANNUAL PHYSICAL EXAM: Primary | ICD-10-CM

## 2025-04-16 DIAGNOSIS — E66.811 CLASS 1 OBESITY DUE TO EXCESS CALORIES WITHOUT SERIOUS COMORBIDITY WITH BODY MASS INDEX (BMI) OF 34.0 TO 34.9 IN ADULT: ICD-10-CM

## 2025-04-16 DIAGNOSIS — K21.00 GASTROESOPHAGEAL REFLUX DISEASE WITH ESOPHAGITIS WITHOUT HEMORRHAGE: ICD-10-CM

## 2025-04-16 PROCEDURE — 99397 PER PM REEVAL EST PAT 65+ YR: CPT | Performed by: INTERNAL MEDICINE

## 2025-04-16 NOTE — PROGRESS NOTES
Name: Jeannine See      : 1945      MRN: [id number]  Encounter Provider: Lea Reyes, MD  Encounter Date: 2025   Encounter department: [facility]    Assessment & Plan         History of Present Illness   {?Quick Links Encounters * My Last Note * Last Note in Specialty * Snapshot * Since Last Visit * History :05587}  HPI  Review of Systems  Past Medical History:   Diagnosis Date   • Acute bronchitis 2020   • Aftercare following left knee joint replacement surgery 2018   • Arthritis    • Arthritis 2018   • Asthma    • AVNRT (AV manny re-entry tachycardia) (HCC) 2018   • Bronchitis 2020   • Chest pain 2018   • Chronic diarrhea 2021   • Chronic pain of right knee 2018   • Conjunctivitis 2020   • Contact lens/glasses fitting    • COVID-19 virus infection 12/15/2020   • Encounter for screening mammogram for malignant neoplasm of breast 2022   • GERD (gastroesophageal reflux disease)    • Gross hematuria 10/02/2018   • Headache(784.0)    • History of hysterectomy 2022   • Hypoestrogenism 2022   • Left knee pain 2018   • Mixed hyperlipidemia 10/01/2019   • Moderate osteopenia 2016   • Obesity    • Osteoarthritis of knee 2016   • Paroxysmal supraventricular tachycardia (HCC) 2013   • Primary osteoarthritis of left knee 2020   • Primary osteoarthritis of right knee 2018   • S/P total knee replacement using cement, left 2018   • Sjogren's syndrome (HCC)    • Status post total knee replacement using cement, right 10/14/2019   • SVT (supraventricular tachycardia) (HCC)     hx of- no episodes in 3 yrs   • SVT (supraventricular tachycardia) (HCC) 2018     Past Surgical History:   Procedure Laterality Date   • AUGMENTATION MAMMAPLASTY Bilateral     RETRO-GLANDULAR SALINE   • AV NODE ABLATION  2017   • BREAST CYST ASPIRATION  1970    ? breast   • BREAST SURGERY       bilateral saline implants-right is ruptured   • CATARACT EXTRACTION     • COLONOSCOPY  2016   • JOINT REPLACEMENT      left knee   • KNEE ARTHROSCOPY Bilateral    • KS ARTHRP KNE CONDYLE&PLATU MEDIAL&LAT COMPARTMENTS Left 09/24/2018    Procedure: ARTHROPLASTY KNEE TOTAL- Left;  Surgeon: Jaxon Zacarias DO;  Location: [facility] MAIN OR;  Service: Orthopedics   • KS ARTHRP KNE CONDYLE&PLATU MEDIAL&LAT COMPARTMENTS Right 10/14/2019    Procedure: ARTHROPLASTY KNEE TOTAL;  Surgeon: Jaxon Zacarias DO;  Location: [facility];  Service: Orthopedics   • KS XCAPSL CTRC RMVL INSJ IO LENS PROSTH W/O ECP Right 08/01/2017    Procedure: EXTRACTION EXTRACAPSULAR CATARACT PHACO INTRAOCULAR LENS (IOL);  Surgeon: Singh Delaney MD;  Location: [facility] MAIN OR;  Service: Ophthalmology   • KS XCAPSL CTRC RMVL INSJ IO LENS PROSTH W/O ECP Left 08/15/2017    Procedure: EXTRACTION EXTRACAPSULAR CATARACT PHACO INTRAOCULAR LENS (IOL);  Surgeon: Singh Delaney MD;  Location: [facility] MAIN OR;  Service: Ophthalmology   • TONSILLECTOMY     • TOTAL ABDOMINAL HYSTERECTOMY Bilateral 1991    age 46   • TOTAL ABDOMINAL HYSTERECTOMY W/ BILATERAL SALPINGOOPHORECTOMY Bilateral 1991    age 46   • TUBAL LIGATION     • UPPER GASTROINTESTINAL ENDOSCOPY       Family History   Problem Relation Age of Onset   • Dementia Mother    • Diabetes Mother    • Aneurysm Father    • Hypertension Brother    • Thyroid disease Brother    • Diabetes Brother    • Prostate cancer Brother 74   • Sudden death Brother    • No Known Problems Daughter    • No Known Problems Daughter    • Breast cancer Maternal Grandmother 70   • No Known Problems Maternal Grandfather    • No Known Problems Paternal Grandmother    • No Known Problems Paternal Grandfather    • Cancer Paternal Aunt 82        unknown   • Colon cancer Neg Hx    • Ovarian cancer Neg Hx      Social History     Tobacco Use   • Smoking status: Former     Current packs/day: 0.00     Types: Cigarettes     Quit date: 9/17/1968      Years since quittin.6   • Smokeless tobacco: Never   Vaping Use   • Vaping status: Never Used   Substance and Sexual Activity   • Alcohol use: Not Currently   • Drug use: No   • Sexual activity: Not Currently     Partners: Male     Current Outpatient Medications on File Prior to Visit   Medication Sig   • albuterol (Ventolin HFA) 90 mcg/act inhaler Inhale 2 puffs every 4 (four) hours as needed for wheezing or shortness of breath   • Ascorbic Acid (C 1000 PO) Take 1 capsule by mouth daily   • Biotin 09389 MCG TABS Take 1 tablet by mouth daily   • calcium carbonate (TUMS) 500 mg chewable tablet Chew 1 tablet if needed for indigestion or heartburn   • Cholecalciferol (D3-1000 PO) Take 1 capsule by mouth daily   • famotidine (PEPCID) 40 MG tablet Take 1 tablet (40 mg total) by mouth daily at bedtime   • Magnesium 400 MG CAPS Take 1 capsule (400 mg total) by mouth in the morning   • metoprolol succinate (TOPROL-XL) 25 mg 24 hr tablet Take 1 tablet (25 mg total) by mouth daily   • omeprazole (PriLOSEC) 40 MG capsule Take 1 capsule (40 mg total) by mouth daily before breakfast   • rosuvastatin (CRESTOR) 5 mg tablet Take 1 tablet (5 mg total) by mouth daily   • Semaglutide-Weight Management (Wegovy) 2.4 MG/0.75ML Inject 2.4 mg under the skin weekly   • vitamin E, tocopherol, 400 units capsule Take 400 Units by mouth daily   • zinc gluconate 50 mg tablet Take 50 mg by mouth daily   • amoxicillin (AMOXIL) 500 mg capsule  (Patient not taking: Reported on 2024)     Allergies   Allergen Reactions   • Other      Adhesive Tape-red, blisters   • Medical Tape Hives     Immunization History   Administered Date(s) Administered   • COVID-19 PFIZER VACCINE 0.3 ML IM 2021, 2021, 2021   • H1N1, All Formulations 11/10/2009   • INFLUENZA 10/13/2004   • Influenza Split High Dose Preservative Free IM 10/17/2018, 10/02/2024   • Influenza, seasonal, injectable 2014   • Pneumococcal Conjugate 13-Valent  "12/16/2015   • Pneumococcal Polysaccharide PPV23 11/09/2016   • Respiratory Syncytial Virus Vaccine (Recombinant) 10/02/2024   • Td (adult), Unspecified 12/13/2004   • Tdap 09/12/2022   • Zoster 12/09/2014   • Zoster Vaccine Recombinant 04/26/2019, 09/03/2019     Objective {?Quick Links Trend Vitals * Enter New Vitals * Results Review * Timeline (Adult) * Labs * Imaging * Cardiology * Procedures * Lung Cancer Screening * Surgical eConsent :95887}  /82 (BP Location: Left arm, Patient Position: Sitting, Cuff Size: Standard)   Pulse 60   Ht 5' 5\" (1.651 m)   Wt 77.4 kg (170 lb 9.6 oz)   SpO2 99%   BMI 28.39 kg/m²     Physical Exam  {Administrative / Billing Section (Optional):84973}  "

## 2025-04-16 NOTE — ASSESSMENT & PLAN NOTE
Alternating between famotidine and omeprazole as needed  Discussed regular use of famotidine and if ineffective, omeprazole  Due for colonoscopy and would most likely need EGD also

## 2025-04-16 NOTE — PROGRESS NOTES
Adult Annual Physical  Name: Jeannine See      : 1945      MRN: [id number]  Encounter Provider: Lea Reyes, MD  Encounter Date: 2025   [facility]: [facility]    :  Assessment & Plan  Annual physical exam         Class 1 obesity due to excess calories without serious comorbidity with body mass index (BMI) of 34.0 to 34.9 in adult  Lost 25 pounds on Wegovy  Currently maintaining weight  Considering switching to Zepbound       Gastroesophageal reflux disease with esophagitis without hemorrhage  Alternating between famotidine and omeprazole as needed  Discussed regular use of famotidine and if ineffective, omeprazole  Due for colonoscopy and would most likely need EGD also       Annual physical exam               Preventive Screenings:  - Diabetes Screening: screening up-to-date  - Cholesterol Screening: screening not indicated and has hyperlipidemia   - Hepatitis C screening: screening up-to-date   - Cervical cancer screening: screening not indicated   - Breast cancer screening: screening up-to-date   - Colon cancer screening: risks/benefits discussed   - Lung cancer screening: screening not indicated   - Osteoporosis screening: risks/benefits discussed          History of Present Illness     Adult Annual Physical:  Patient presents for annual physical.     Diet and Physical Activity:  - Diet/Nutrition: no special diet.  - Exercise: no formal exercise.    Depression Screening:  - PHQ-2 Score: 0    General Health:    - Hearing: normal hearing bilateral ears.  - Vision: most recent eye exam < 1 year ago.  - Dental: regular dental visits.    /GYN Health:    - Menopause: postmenopausal.     Review of Systems   Constitutional:  Negative for unexpected weight change.   Respiratory:  Negative for shortness of breath.    Cardiovascular:  Negative for chest pain and palpitations.   Gastrointestinal:  Negative for abdominal pain, constipation, diarrhea, nausea and  "vomiting.   Neurological:  Negative for dizziness and headaches.         Objective   /82 (BP Location: Left arm, Patient Position: Sitting, Cuff Size: Standard)   Pulse 60   Ht 5' 5\" (1.651 m)   Wt 77.4 kg (170 lb 9.6 oz)   SpO2 99%   BMI 28.39 kg/m²     Physical Exam  Constitutional:       General: She is not in acute distress.     Appearance: She is well-developed. She is not ill-appearing, toxic-appearing or diaphoretic.   HENT:      Right Ear: External ear normal. There is no impacted cerumen.      Left Ear: External ear normal. There is no impacted cerumen.   Eyes:      Conjunctiva/sclera: Conjunctivae normal.   Cardiovascular:      Rate and Rhythm: Normal rate and regular rhythm.      Heart sounds: Normal heart sounds. No murmur heard.  Pulmonary:      Effort: Pulmonary effort is normal. No respiratory distress.      Breath sounds: Normal breath sounds. No stridor. No wheezing or rales.   Abdominal:      General: There is no distension.      Palpations: Abdomen is soft. There is no mass.      Tenderness: There is no abdominal tenderness. There is no guarding or rebound.   Musculoskeletal:      Cervical back: Neck supple.      Right lower leg: No edema.      Left lower leg: No edema.   Neurological:      Mental Status: She is alert and oriented to person, place, and time.   Psychiatric:         Mood and Affect: Mood normal.         Behavior: Behavior normal.         Thought Content: Thought content normal.         Judgment: Judgment normal.         "

## 2025-04-16 NOTE — PATIENT INSTRUCTIONS
"Patient Education     Routine physical for adults   The Basics   Written by the doctors and editors at [facility]   What is a physical? -- A physical is a routine visit, or \"check-up,\" with your doctor. You might also hear it called a \"wellness visit\" or \"preventive visit.\"  During each visit, the doctor will:   Ask about your physical and mental health   Ask about your habits, behaviors, and lifestyle   Do an exam   Give you vaccines if needed   Talk to you about any medicines you take   Give advice about your health   Answer your questions  Getting regular check-ups is an important part of taking care of your health. It can help your doctor find and treat any problems you have. But it's also important for preventing health problems.  A routine physical is different from a \"sick visit.\" A sick visit is when you see a doctor because of a health concern or problem. Since physicals are scheduled ahead of time, you can think about what you want to ask the doctor.  How often should I get a physical? -- It depends on your age and health. In general, for people age 21 years and older:   If you are younger than 50 years, you might be able to get a physical every 3 years.   If you are 50 years or older, your doctor might recommend a physical every year.  If you have an ongoing health condition, like diabetes or high blood pressure, your doctor will probably want to see you more often.  What happens during a physical? -- In general, each visit will include:   Physical exam - The doctor or nurse will check your height, weight, heart rate, and blood pressure. They will also look at your eyes and ears. They will ask about how you are feeling and whether you have any symptoms that bother you.   Medicines - It's a good idea to bring a list of all the medicines you take to each doctor visit. Your doctor will talk to you about your medicines and answer any questions. Tell them if you are having any side effects that bother you. You " "should also tell them if you are having trouble paying for any of your medicines.   Habits and behaviors - This includes:   Your diet   Your exercise habits   Whether you smoke, drink alcohol, or use drugs   Whether you are sexually active   Whether you feel safe at home  Your doctor will talk to you about things you can do to improve your health and lower your risk of health problems. They will also offer help and support. For example, if you want to quit smoking, they can give you advice and might prescribe medicines. If you want to improve your diet or get more physical activity, they can help you with this, too.   Lab tests, if needed - The tests you get will depend on your age and situation. For example, your doctor might want to check your:   Cholesterol   Blood sugar   Iron level   Vaccines - The recommended vaccines will depend on your age, health, and what vaccines you already had. Vaccines are very important because they can prevent certain serious or deadly infections.   Discussion of screening - \"Screening\" means checking for diseases or other health problems before they cause symptoms. Your doctor can recommend screening based on your age, risk, and preferences. This might include tests to check for:   Cancer, such as breast, prostate, cervical, ovarian, colorectal, prostate, lung, or skin cancer   Sexually transmitted infections, such as chlamydia and gonorrhea   Mental health conditions like depression and anxiety  Your doctor will talk to you about the different types of screening tests. They can help you decide which screenings to have. They can also explain what the results might mean.   Answering questions - The physical is a good time to ask the doctor or nurse questions about your health. If needed, they can refer you to other doctors or specialists, too.  Adults older than 65 years often need other care, too. As you get older, your doctor will talk to you about:   How to prevent falling at " home   Hearing or vision tests   Memory testing   How to take your medicines safely   Making sure that you have the help and support you need at home  All topics are updated as new evidence becomes available and our peer review process is complete.  This topic retrieved from [employer] on: May 02, 2024.  Topic 567429 Version 1.0  Release: 32.4.3 - C32.122  © 2024 UpToDate, Inc. and/or its affiliates. All rights reserved.  Consumer Information Use and Disclaimer   Disclaimer: This generalized information is a limited summary of diagnosis, treatment, and/or medication information. It is not meant to be comprehensive and should be used as a tool to help the user understand and/or assess potential diagnostic and treatment options. It does NOT include all information about conditions, treatments, medications, side effects, or risks that may apply to a specific patient. It is not intended to be medical advice or a substitute for the medical advice, diagnosis, or treatment of a health care provider based on the health care provider's examination and assessment of a patient's specific and unique circumstances. Patients must speak with a health care provider for complete information about their health, medical questions, and treatment options, including any risks or benefits regarding use of medications. This information does not endorse any treatments or medications as safe, effective, or approved for treating a specific patient. UpToDate, Inc. and its affiliates disclaim any warranty or liability relating to this information or the use thereof.The use of this information is governed by the Terms of Use, available at https://www.woltersKwanjiuwer.com/en/know/clinical-effectiveness-terms. 2024© UpToDate, Inc. and its affiliates and/or licensors. All rights reserved.  Copyright   © 2024 UpToDate, Inc. and/or its affiliates. All rights reserved.

## 2025-04-25 DIAGNOSIS — E66.09 CLASS 1 OBESITY DUE TO EXCESS CALORIES WITHOUT SERIOUS COMORBIDITY WITH BODY MASS INDEX (BMI) OF 34.0 TO 34.9 IN ADULT: ICD-10-CM

## 2025-04-25 DIAGNOSIS — J45.20 MILD INTERMITTENT ASTHMA WITHOUT COMPLICATION: ICD-10-CM

## 2025-04-25 DIAGNOSIS — E66.811 CLASS 1 OBESITY DUE TO EXCESS CALORIES WITHOUT SERIOUS COMORBIDITY WITH BODY MASS INDEX (BMI) OF 34.0 TO 34.9 IN ADULT: ICD-10-CM

## 2025-04-25 RX ORDER — SEMAGLUTIDE 2.4 MG/.75ML
INJECTION, SOLUTION SUBCUTANEOUS
Qty: 9 ML | Refills: 3 | Status: SHIPPED | OUTPATIENT
Start: 2025-04-25

## 2025-04-25 RX ORDER — ALBUTEROL SULFATE 90 UG/1
2 INHALANT RESPIRATORY (INHALATION) EVERY 4 HOURS PRN
Qty: 54 G | Refills: 1 | Status: SHIPPED | OUTPATIENT
Start: 2025-04-25

## 2025-05-05 ENCOUNTER — HOSPITAL ENCOUNTER (OUTPATIENT)
Dept: RADIOLOGY | Age: 80
Discharge: HOME | End: 2025-05-05
Payer: COMMERCIAL

## 2025-05-05 VITALS — WEIGHT: 169 LBS | BODY MASS INDEX: 28.85 KG/M2 | HEIGHT: 64 IN

## 2025-05-05 DIAGNOSIS — M85.80 OSTEOPENIA, UNSPECIFIED LOCATION: ICD-10-CM

## 2025-05-05 PROCEDURE — 77080 DXA BONE DENSITY AXIAL: CPT

## 2025-05-12 ENCOUNTER — HOSPITAL ENCOUNTER (OUTPATIENT)
Dept: MAMMOGRAPHY | Facility: HOSPITAL | Age: 80
Discharge: HOME | End: 2025-05-12
Payer: COMMERCIAL

## 2025-05-12 VITALS — WEIGHT: 169 LBS | BODY MASS INDEX: 28.85 KG/M2 | HEIGHT: 64 IN

## 2025-05-12 DIAGNOSIS — Z12.31 ENCOUNTER FOR SCREENING MAMMOGRAM FOR MALIGNANT NEOPLASM OF BREAST: ICD-10-CM

## 2025-05-12 PROCEDURE — 77067 SCR MAMMO BI INCL CAD: CPT

## 2025-05-12 PROCEDURE — 77063 BREAST TOMOSYNTHESIS BI: CPT

## 2025-05-28 DIAGNOSIS — E66.09 CLASS 1 OBESITY DUE TO EXCESS CALORIES WITHOUT SERIOUS COMORBIDITY WITH BODY MASS INDEX (BMI) OF 34.0 TO 34.9 IN ADULT: ICD-10-CM

## 2025-05-28 DIAGNOSIS — E66.811 CLASS 1 OBESITY DUE TO EXCESS CALORIES WITHOUT SERIOUS COMORBIDITY WITH BODY MASS INDEX (BMI) OF 34.0 TO 34.9 IN ADULT: ICD-10-CM

## 2025-05-29 RX ORDER — SEMAGLUTIDE 2.4 MG/.75ML
INJECTION, SOLUTION SUBCUTANEOUS
Qty: 9 ML | Refills: 0 | Status: SHIPPED | OUTPATIENT
Start: 2025-05-29

## 2025-06-10 DIAGNOSIS — I49.3 PVC (PREMATURE VENTRICULAR CONTRACTION): ICD-10-CM

## 2025-06-10 DIAGNOSIS — E78.00 PURE HYPERCHOLESTEROLEMIA: ICD-10-CM

## 2025-06-11 RX ORDER — ROSUVASTATIN CALCIUM 5 MG/1
5 TABLET, COATED ORAL DAILY
Qty: 90 TABLET | Refills: 0 | Status: SHIPPED | OUTPATIENT
Start: 2025-06-11

## 2025-06-11 RX ORDER — METOPROLOL SUCCINATE 25 MG/1
25 TABLET, EXTENDED RELEASE ORAL DAILY
Qty: 90 TABLET | Refills: 0 | Status: SHIPPED | OUTPATIENT
Start: 2025-06-11

## 2025-06-27 DIAGNOSIS — E66.09 CLASS 1 OBESITY DUE TO EXCESS CALORIES WITHOUT SERIOUS COMORBIDITY WITH BODY MASS INDEX (BMI) OF 34.0 TO 34.9 IN ADULT: ICD-10-CM

## 2025-06-27 DIAGNOSIS — E66.811 CLASS 1 OBESITY DUE TO EXCESS CALORIES WITHOUT SERIOUS COMORBIDITY WITH BODY MASS INDEX (BMI) OF 34.0 TO 34.9 IN ADULT: ICD-10-CM

## 2025-06-27 RX ORDER — SEMAGLUTIDE 2.4 MG/.75ML
INJECTION, SOLUTION SUBCUTANEOUS
Qty: 9 ML | Refills: 0 | Status: SHIPPED | OUTPATIENT
Start: 2025-06-27

## 2025-07-18 DIAGNOSIS — E66.09 CLASS 1 OBESITY DUE TO EXCESS CALORIES WITHOUT SERIOUS COMORBIDITY WITH BODY MASS INDEX (BMI) OF 34.0 TO 34.9 IN ADULT: ICD-10-CM

## 2025-07-18 DIAGNOSIS — E66.811 CLASS 1 OBESITY DUE TO EXCESS CALORIES WITHOUT SERIOUS COMORBIDITY WITH BODY MASS INDEX (BMI) OF 34.0 TO 34.9 IN ADULT: ICD-10-CM

## 2025-07-21 RX ORDER — SEMAGLUTIDE 2.4 MG/.75ML
INJECTION, SOLUTION SUBCUTANEOUS
Qty: 9 ML | Refills: 0 | Status: SHIPPED | OUTPATIENT
Start: 2025-07-21 | End: 2025-07-25

## 2025-07-23 ENCOUNTER — TELEPHONE (OUTPATIENT)
Dept: INTERNAL MEDICINE CLINIC | Facility: CLINIC | Age: 80
End: 2025-07-23

## 2025-07-23 NOTE — TELEPHONE ENCOUNTER
Prior Authorization requested for Wegovy. Your patient is due for a follow up visit for medication management and weight check. Patient's last office visit was 4/2025. Once visit is completed please send message back to the prior authorization POD so a prior authorization can be submitted. Thank you

## 2025-07-24 NOTE — TELEPHONE ENCOUNTER
Please call patient to schedule an appointment for follow up. Her PA for the Wegovy is pending recent clinical notes.

## 2025-07-25 ENCOUNTER — OFFICE VISIT (OUTPATIENT)
Dept: INTERNAL MEDICINE CLINIC | Facility: CLINIC | Age: 80
End: 2025-07-25
Payer: COMMERCIAL

## 2025-07-25 VITALS
HEIGHT: 61 IN | SYSTOLIC BLOOD PRESSURE: 110 MMHG | OXYGEN SATURATION: 95 % | HEART RATE: 74 BPM | WEIGHT: 173 LBS | BODY MASS INDEX: 32.66 KG/M2 | DIASTOLIC BLOOD PRESSURE: 60 MMHG

## 2025-07-25 DIAGNOSIS — E78.00 PURE HYPERCHOLESTEROLEMIA: ICD-10-CM

## 2025-07-25 DIAGNOSIS — E66.09 CLASS 1 OBESITY DUE TO EXCESS CALORIES WITHOUT SERIOUS COMORBIDITY WITH BODY MASS INDEX (BMI) OF 34.0 TO 34.9 IN ADULT: Primary | ICD-10-CM

## 2025-07-25 DIAGNOSIS — K21.00 GASTROESOPHAGEAL REFLUX DISEASE WITH ESOPHAGITIS WITHOUT HEMORRHAGE: ICD-10-CM

## 2025-07-25 DIAGNOSIS — R73.03 PREDIABETES: ICD-10-CM

## 2025-07-25 DIAGNOSIS — E66.811 CLASS 1 OBESITY DUE TO EXCESS CALORIES WITHOUT SERIOUS COMORBIDITY WITH BODY MASS INDEX (BMI) OF 34.0 TO 34.9 IN ADULT: Primary | ICD-10-CM

## 2025-07-25 PROCEDURE — 99214 OFFICE O/P EST MOD 30 MIN: CPT | Performed by: INTERNAL MEDICINE

## 2025-07-25 RX ORDER — FAMOTIDINE 40 MG/1
TABLET, FILM COATED ORAL
Qty: 90 TABLET | Refills: 3 | Status: SHIPPED | OUTPATIENT
Start: 2025-07-25

## 2025-07-25 RX ORDER — TIRZEPATIDE 10 MG/.5ML
10 INJECTION, SOLUTION SUBCUTANEOUS WEEKLY
Qty: 2 ML | Refills: 2 | Status: SHIPPED | OUTPATIENT
Start: 2025-07-25

## 2025-07-25 NOTE — ASSESSMENT & PLAN NOTE
Prior Authorization Clinical Questions for Weight Management Pharmacotherapy    1. Does the patient have a contrainidcation to medication prescribed for weight management?: No  2. Does the patient have a diagnosis of obesity, confirmed by a BMI greater than or equal to 30 kg/m^2?: Yes  3. Does the patient have a BMI of greater than or equal to 27 kg/m^2 with at least one weight-related comorbidity/risk factor/complication (e.g. diabetes, dyslipidemia, coronary artery disease)?: Yes  4. Weight-related co-morbidities/risk factors: dyslipidemia, obstructive sleep apnea (TABITHA), GERD, asthma/reactive airway disease  6. ZEPBOUND TABITHA Indication: Does patient have documented TABITHA diagnosed via sleep study (insurance will require copy of sleep study results for approval)?: Yes  7. Has the patient been on a weight loss regimen of low-calorie diet, increased physical activity, and lifestyle modifications for a minimum of 6 months?: Yes  8. Has the patient completed a comprehensive weight loss program (ie, Weight Watchers, Noom, Bariatrics, other charisma on phone)? If so, what?: Yes  9. Does the patient have a history of type 2 diabetes?: No  10. Has the member tried and failed other weight loss medication within the past 12 months?: Yes  11. Will the member use requested medication in combination with another GLP agonist or weight loss drug?: No  12. Is the medication a controlled substance?: No     Baseline weight (in pounds): 195 lbs  Current weight (in pounds): 173 lbs  Weight loss percentage: -11.28%     Lost more than 25 pounds with Wegovy however she has gained 4 pounds in the past 2 months in spite of Wegovy 2.4 mg  Switch to Zepbound 10 mg    Orders:  •  tirzepatide (Zepbound) 10 mg/0.5 mL auto-injector; Inject 0.5 mL (10 mg total) under the skin once a week

## 2025-07-25 NOTE — PROGRESS NOTES
Name: Jeannine See      : 1945      MRN: [id number]  Encounter Provider: Lea Reyes, MD  Encounter Date: 2025   [facility]  :  Assessment & Plan  Class 1 obesity due to excess calories without serious comorbidity with body mass index (BMI) of 34.0 to 34.9 in adult  Prior Authorization Clinical Questions for Weight Management Pharmacotherapy    1. Does the patient have a contrainidcation to medication prescribed for weight management?: No  2. Does the patient have a diagnosis of obesity, confirmed by a BMI greater than or equal to 30 kg/m^2?: Yes  3. Does the patient have a BMI of greater than or equal to 27 kg/m^2 with at least one weight-related comorbidity/risk factor/complication (e.g. diabetes, dyslipidemia, coronary artery disease)?: Yes  4. Weight-related co-morbidities/risk factors: dyslipidemia, obstructive sleep apnea (TABITHA), GERD, asthma/reactive airway disease  6. ZEPBOUND TABITHA Indication: Does patient have documented TABITHA diagnosed via sleep study (insurance will require copy of sleep study results for approval)?: Yes  7. Has the patient been on a weight loss regimen of low-calorie diet, increased physical activity, and lifestyle modifications for a minimum of 6 months?: Yes  8. Has the patient completed a comprehensive weight loss program (ie, Weight Watchers, Noom, Bariatrics, other charisma on phone)? If so, what?: Yes  9. Does the patient have a history of type 2 diabetes?: No  10. Has the member tried and failed other weight loss medication within the past 12 months?: Yes  11. Will the member use requested medication in combination with another GLP agonist or weight loss drug?: No  12. Is the medication a controlled substance?: No     Baseline weight (in pounds): 195 lbs  Current weight (in pounds): 173 lbs  Weight loss percentage: -11.28%     Lost more than 25 pounds with Wegovy however she has gained 4 pounds in the past 2 months in spite  "of Wegovy 2.4 mg  Switch to Zepbound 10 mg    Orders:  •  tirzepatide (Zepbound) 10 mg/0.5 mL auto-injector; Inject 0.5 mL (10 mg total) under the skin once a week    Gastroesophageal reflux disease with esophagitis without hemorrhage  Reflux symptoms on famotidine  Discussed switching over to omeprazole which she already has at home  Orders:  •  famotidine (PEPCID) 40 MG tablet; Take 1 tablet daily    Pure hypercholesterolemia    Orders:  •  CBC and differential; Future  •  Comprehensive metabolic panel; Future  •  Lipid panel; Future    Prediabetes    Orders:  •  CBC and differential; Future  •  Comprehensive metabolic panel; Future  •  Hemoglobin A1C; Future           History of Present Illness   Here for a follow-up  Considering switching to Zepbound due to weight gain on Wegovy 2.4 mg in the past 2 months  No longer feeling the early satiety      Review of Systems   Constitutional:  Positive for unexpected weight change (Weight gain).   Respiratory:  Negative for shortness of breath.    Cardiovascular:  Negative for chest pain and palpitations.   Gastrointestinal:  Negative for constipation and diarrhea.        Frequent reflux       Objective   /60 (BP Location: Left arm, Patient Position: Sitting, Cuff Size: Standard)   Pulse 74   Ht 5' 0.75\" (1.543 m)   Wt 78.5 kg (173 lb)   SpO2 95%   BMI 32.96 kg/m²      Physical Exam  Constitutional:       General: She is not in acute distress.     Appearance: She is well-developed. She is not ill-appearing, toxic-appearing or diaphoretic.     Eyes:      Conjunctiva/sclera: Conjunctivae normal.       Cardiovascular:      Rate and Rhythm: Normal rate and regular rhythm.      Heart sounds: Normal heart sounds. No murmur heard.  Pulmonary:      Effort: Pulmonary effort is normal. No respiratory distress.      Breath sounds: Normal breath sounds. No stridor. No wheezing or rales.   Abdominal:      General: There is no distension.      Palpations: Abdomen is soft. " There is no mass.      Tenderness: There is no abdominal tenderness. There is no guarding or rebound.     Musculoskeletal:      Cervical back: Neck supple.      Right lower leg: No edema.      Left lower leg: No edema.     Neurological:      Mental Status: She is alert and oriented to person, place, and time.     Psychiatric:         Mood and Affect: Mood normal.         Behavior: Behavior normal.         Thought Content: Thought content normal.         Judgment: Judgment normal.

## 2025-07-25 NOTE — ASSESSMENT & PLAN NOTE
Reflux symptoms on famotidine  Discussed switching over to omeprazole which she already has at home  Orders:  •  famotidine (PEPCID) 40 MG tablet; Take 1 tablet daily

## 2025-07-28 ENCOUNTER — TELEPHONE (OUTPATIENT)
Dept: INTERNAL MEDICINE CLINIC | Facility: CLINIC | Age: 80
End: 2025-07-28

## 2025-07-31 ENCOUNTER — TELEPHONE (OUTPATIENT)
Dept: INTERNAL MEDICINE CLINIC | Facility: CLINIC | Age: 80
End: 2025-07-31

## 2025-08-04 ENCOUNTER — OFFICE VISIT (OUTPATIENT)
Dept: CARDIOLOGY CLINIC | Facility: CLINIC | Age: 80
End: 2025-08-04
Payer: COMMERCIAL

## 2025-08-04 VITALS
WEIGHT: 174 LBS | HEART RATE: 66 BPM | DIASTOLIC BLOOD PRESSURE: 70 MMHG | SYSTOLIC BLOOD PRESSURE: 118 MMHG | OXYGEN SATURATION: 98 % | BODY MASS INDEX: 33.15 KG/M2

## 2025-08-04 DIAGNOSIS — E78.00 PURE HYPERCHOLESTEROLEMIA: ICD-10-CM

## 2025-08-04 DIAGNOSIS — I49.3 PVC (PREMATURE VENTRICULAR CONTRACTION): ICD-10-CM

## 2025-08-04 DIAGNOSIS — G47.33 OBSTRUCTIVE SLEEP APNEA SYNDROME: ICD-10-CM

## 2025-08-04 DIAGNOSIS — R00.2 HEART PALPITATIONS: Primary | ICD-10-CM

## 2025-08-04 DIAGNOSIS — I71.21 ANEURYSM OF ASCENDING AORTA WITHOUT RUPTURE (HCC): ICD-10-CM

## 2025-08-04 DIAGNOSIS — R06.09 DOE (DYSPNEA ON EXERTION): ICD-10-CM

## 2025-08-04 PROCEDURE — 99214 OFFICE O/P EST MOD 30 MIN: CPT | Performed by: INTERNAL MEDICINE

## 2025-08-04 RX ORDER — METOPROLOL SUCCINATE 25 MG/1
25 TABLET, EXTENDED RELEASE ORAL DAILY
Qty: 90 TABLET | Refills: 3 | Status: SHIPPED | OUTPATIENT
Start: 2025-08-04

## 2025-08-04 RX ORDER — ROSUVASTATIN CALCIUM 5 MG/1
5 TABLET, COATED ORAL DAILY
Qty: 90 TABLET | Refills: 3 | Status: SHIPPED | OUTPATIENT
Start: 2025-08-04

## 2025-08-20 DIAGNOSIS — J45.20 MILD INTERMITTENT ASTHMA WITHOUT COMPLICATION: ICD-10-CM

## 2025-08-20 DIAGNOSIS — E66.09 CLASS 1 OBESITY DUE TO EXCESS CALORIES WITHOUT SERIOUS COMORBIDITY WITH BODY MASS INDEX (BMI) OF 34.0 TO 34.9 IN ADULT: ICD-10-CM

## 2025-08-20 DIAGNOSIS — E66.811 CLASS 1 OBESITY DUE TO EXCESS CALORIES WITHOUT SERIOUS COMORBIDITY WITH BODY MASS INDEX (BMI) OF 34.0 TO 34.9 IN ADULT: ICD-10-CM

## 2025-08-21 RX ORDER — ALBUTEROL SULFATE 90 UG/1
2 INHALANT RESPIRATORY (INHALATION) EVERY 4 HOURS PRN
Qty: 54 G | Refills: 0 | OUTPATIENT
Start: 2025-08-21

## 2025-08-21 RX ORDER — TIRZEPATIDE 10 MG/.5ML
10 INJECTION, SOLUTION SUBCUTANEOUS WEEKLY
Qty: 2 ML | Refills: 0 | Status: SHIPPED | OUTPATIENT
Start: 2025-08-21